# Patient Record
Sex: FEMALE | Race: BLACK OR AFRICAN AMERICAN | NOT HISPANIC OR LATINO | Employment: UNEMPLOYED | URBAN - METROPOLITAN AREA
[De-identification: names, ages, dates, MRNs, and addresses within clinical notes are randomized per-mention and may not be internally consistent; named-entity substitution may affect disease eponyms.]

---

## 2021-11-13 ENCOUNTER — OFFICE VISIT (OUTPATIENT)
Dept: URGENT CARE | Facility: CLINIC | Age: 36
End: 2021-11-13

## 2021-11-13 VITALS
WEIGHT: 186.4 LBS | HEART RATE: 95 BPM | BODY MASS INDEX: 31.06 KG/M2 | OXYGEN SATURATION: 98 % | DIASTOLIC BLOOD PRESSURE: 62 MMHG | SYSTOLIC BLOOD PRESSURE: 116 MMHG | HEIGHT: 65 IN | RESPIRATION RATE: 16 BRPM | TEMPERATURE: 98.9 F

## 2021-11-13 DIAGNOSIS — O21.9 NAUSEA AND VOMITING DURING PREGNANCY PRIOR TO 22 WEEKS GESTATION: ICD-10-CM

## 2021-11-13 DIAGNOSIS — N92.6 MISSED PERIOD: ICD-10-CM

## 2021-11-13 DIAGNOSIS — Z32.01 POSITIVE PREGNANCY TEST: Primary | ICD-10-CM

## 2021-11-13 LAB
APPEARANCE UR: CLEAR
BILIRUB UR STRIP-MCNC: NORMAL MG/DL
COLOR UR AUTO: YELLOW
GLUCOSE UR STRIP.AUTO-MCNC: NORMAL MG/DL
INT CON NEG: NEGATIVE
INT CON POS: POSITIVE
KETONES UR STRIP.AUTO-MCNC: NORMAL MG/DL
LEUKOCYTE ESTERASE UR QL STRIP.AUTO: NORMAL
NITRITE UR QL STRIP.AUTO: NORMAL
PH UR STRIP.AUTO: 7.5 [PH] (ref 5–8)
POC URINE PREGNANCY TEST: NORMAL
PROT UR QL STRIP: 30 MG/DL
RBC UR QL AUTO: NORMAL
SP GR UR STRIP.AUTO: 1.03
UROBILINOGEN UR STRIP-MCNC: 0.2 MG/DL

## 2021-11-13 PROCEDURE — 81002 URINALYSIS NONAUTO W/O SCOPE: CPT | Performed by: PHYSICIAN ASSISTANT

## 2021-11-13 PROCEDURE — 99203 OFFICE O/P NEW LOW 30 MIN: CPT | Performed by: PHYSICIAN ASSISTANT

## 2021-11-13 PROCEDURE — 81025 URINE PREGNANCY TEST: CPT | Performed by: PHYSICIAN ASSISTANT

## 2021-11-13 RX ORDER — DOXYLAMINE SUCCINATE AND PYRIDOXINE HYDROCHLORIDE, DELAYED RELEASE TABLETS 10 MG/10 MG 10; 10 MG/1; MG/1
1 TABLET, DELAYED RELEASE ORAL 2 TIMES DAILY PRN
Qty: 60 TABLET | Refills: 0 | Status: SHIPPED | OUTPATIENT
Start: 2021-11-13 | End: 2021-12-13

## 2021-11-13 RX ORDER — VITAMIN A, VITAMIN C, VITAMIN D-3, VITAMIN E, VITAMIN B-1, VITAMIN B-2, NIACIN, VITAMIN B-6, CALCIUM, IRON, ZINC, COPPER 4000; 120; 400; 22; 1.84; 3; 20; 10; 1; 12; 200; 27; 25; 2 [IU]/1; MG/1; [IU]/1; MG/1; MG/1; MG/1; MG/1; MG/1; MG/1; UG/1; MG/1; MG/1; MG/1; MG/1
1 TABLET ORAL DAILY
Qty: 30 TABLET | Refills: 9 | Status: SHIPPED | OUTPATIENT
Start: 2021-11-13 | End: 2022-07-17

## 2021-11-13 ASSESSMENT — ENCOUNTER SYMPTOMS: NAUSEA: 1

## 2021-11-13 NOTE — PROGRESS NOTES
"Josey Hernandez is a 36 y.o. female who presents with Nausea (Nausea, vomiting, weakness, loss of appetite)            Patient presents with:  Nausea: Nausea, vomiting, weakness, loss of appetite due to pregnancy.    + home preg x 2    LMP : 9/14/2021.    Needs pregnancy test here so she can been seen at [facility].      Nausea  This is a new problem. The current episode started in the past 7 days. The problem occurs daily. The problem has been waxing and waning. Associated symptoms include nausea and vomiting. Pertinent negatives include no abdominal pain, chills or fever. The symptoms are aggravated by eating and drinking. Treatments tried: change of food choices., ginger ale. The treatment provided mild relief.       Review of Systems   Constitutional: Negative for chills and fever.   Gastrointestinal: Positive for nausea and vomiting. Negative for abdominal pain and diarrhea.   Genitourinary: Negative.    All other systems reviewed and are negative.             Objective     /62   Pulse 95   Temp 37.2 °C (98.9 °F)   Resp 16   Ht 1.64 m (5' 4.57\")   Wt 84.6 kg (186 lb 6.4 oz)   SpO2 98%   BMI 31.44 kg/m²      Physical Exam  Vitals and nursing note reviewed.   Constitutional:       General: She is not in acute distress.     Appearance: Normal appearance. She is well-developed and normal weight. She is not ill-appearing or toxic-appearing.   HENT:      Head: Normocephalic and atraumatic.      Right Ear: Tympanic membrane normal.      Left Ear: Tympanic membrane normal.      Nose: Nose normal.      Mouth/Throat:      Lips: Pink.      Mouth: Mucous membranes are moist.      Pharynx: Oropharynx is clear. Uvula midline.   Eyes:      Extraocular Movements: Extraocular movements intact.      Conjunctiva/sclera: Conjunctivae normal.      Pupils: Pupils are equal, round, and reactive to light.   Cardiovascular:      Rate and Rhythm: Normal rate and regular rhythm.      Pulses: Normal " pulses.      Heart sounds: Normal heart sounds.   Pulmonary:      Effort: Pulmonary effort is normal.      Breath sounds: Normal breath sounds.   Abdominal:      General: Bowel sounds are normal.      Palpations: Abdomen is soft.   Musculoskeletal:         General: Normal range of motion.      Cervical back: Normal range of motion and neck supple.   Skin:     General: Skin is warm and dry.      Capillary Refill: Capillary refill takes less than 2 seconds.   Neurological:      General: No focal deficit present.      Mental Status: She is alert and oriented to person, place, and time.      Cranial Nerves: No cranial nerve deficit.      Motor: Motor function is intact.      Coordination: Coordination is intact.      Gait: Gait normal.   Psychiatric:         Mood and Affect: Mood normal.                 Lab Results/POC Test Results   Results for orders placed or performed in visit on 11/13/21   POCT Urinalysis   Result Value Ref Range    POC Color Yellow Negative    POC Appearance Clear Negative    POC Leukocyte Esterase NEG Negative    POC Nitrites NEG Negative    POC Urobiligen 0.2 Negative (0.2) mg/dL    POC Protein 30 Negative mg/dL    POC Urine PH 7.5 5.0 - 8.0    POC Blood NEG Negative    POC Specific Gravity 1.030 <1.005 - >1.030    POC Ketones NEG Negative mg/dL    POC Bilirubin NEG Negative mg/dL    POC Glucose NEG Negative mg/dL   POCT Pregnancy   Result Value Ref Range    POC Urine Pregnancy Test POS Negative    Internal Control Positive Positive     Internal Control Negative Negative                          Assessment & Plan           1. Positive pregnancy test  Doxylamine-Pyridoxine 10-10 MG Tablet Delayed Response delayed-release tablet    Prenatal Vit-Fe Fumarate-FA (PRENATAL VITAMIN PLUS LOW IRON) 27-1 MG Tab    Referral to OB/Gyn   2. Missed period  POCT Urinalysis    POCT Pregnancy    Doxylamine-Pyridoxine 10-10 MG Tablet Delayed Response delayed-release tablet    Prenatal Vit-Fe Fumarate-FA (PRENATAL  VITAMIN PLUS LOW IRON) 27-1 MG Tab    Referral to OB/Gyn   3. Nausea and vomiting during pregnancy prior to 22 weeks gestation  POCT Urinalysis    POCT Pregnancy    Doxylamine-Pyridoxine 10-10 MG Tablet Delayed Response delayed-release tablet    Prenatal Vit-Fe Fumarate-FA (PRENATAL VITAMIN PLUS LOW IRON) 27-1 MG Tab    Referral to OB/Gyn       Patient was evaluated in clinic today while wearing appropriate personal protective equipment.      PT to begin prescription medications today as discussed for morning sickness.    Referral to OB given to patient.     PT should follow up with PCP in 1-2 days for re-evaluation if symptoms have not improved.      Discussed red flags and reasons to return to UC or ED.      Pt and/or family verbalized understanding of diagnosis and follow up instructions and was offered informational handout on diagnosis.  PT discharged.

## 2021-11-13 NOTE — PATIENT INSTRUCTIONS
Pregnancy and Medications  Most of the time, medicine a pregnant woman is taking does not enter the fetus, but sometimes it can. This may cause damage or birth defects. The risk of damage being done to a fetus is the greatest in the first few weeks of pregnancy. This is when major organs are developing. If you are taking any prescription, over-the-counter or herbal medicines, it is best to talk to your caregiver about the medications you are taking before getting pregnant. If you become pregnant, stop taking over-the-counter and herbal medications right away. Tell your caregiver what you were taking. Also, tell him/her medications, if any, you took before knowing you were pregnant. Never take any drugs during pregnancy unless your caregiver gives you permission.  Other things like caffeine, vitamins, herbal teas and remedies can affect the growing fetus. Talk to your caregiver about cutting down on caffeine. Also, ask what type of vitamins you need to take. Never use any herbal product without talking to your caregiver first.   MEDICINES THAT ARE NOT SAFE TO TAKE DURING PREGNANCY   · Category A - drugs that have been tested for safety during pregnancy and have been found to be safe. This includes drugs such as:  · Folic acid.  · Vitamin B6.  · Thyroid medicine in moderation or in prescribed doses.  · Category B - drugs that have been used a lot during pregnancy and do not appear to cause major birth defects or other problems. This includes drugs such as:  · Some antibiotics.  · Acetaminophen (Tylenol®).  · Aspartame (artificial sweetener).  · Famotidine (Pepcid®).  · Prednisone (cortisone).  · Insulin (for diabetes).  · Ibuprofen (Advil®, Motrin®) before the third trimester. Pregnant women should not take ibuprofen during the last three months of pregnancy.  · Category C - drugs that are more likely to cause problems for the mother or fetus. It also includes drugs for which safety studies have not been finished. The  majority of these drugs do not have safety studies in progress. These drugs often come with a warning that they should be used only if the benefits of taking them outweigh the risks. This is something a woman would need to carefully discuss with her caregiver. These drugs include:  · Prochlorperzaine (Compazine®).  · Sudafed®.  · Fluconazole (Diflucan®).  · Ciprofloxacin (Cipro®).  · Some antidepressants are also included in this group.  · Category D - drugs that have clear health risks for the fetus. They include:  · Alcohol.  · Lithium (used to treat manic depression).  · Phenytoin (Dilantin®).  · Most chemotherapy drugs to treat cancer. In some cases, chemotherapy drugs are given during pregnancy.  · Category X - drugs that have been shown to cause birth defects. They should never be taken during pregnancy. These include:  · Drugs to treat skin conditions like cystic acne (Accutane®) and psoriasis (Tegison® or Soriatane®).  · Thalidomide (a sedative).  · Diethylstilbestrol or CHINTAN.  No medication is considered 100% safe to take when pregnant because everyone reacts to drugs differently.  Aspirin and other drugs containing salicylate are not recommended during pregnancy, especially during the last three months because it can cause bleeding. In rare cases, a woman's caregiver may want her to use these types of drugs under close watch. Acetylsalicylate, a common ingredient in many over-the-counter painkillers, may make a pregnancy last longer. It may cause severe bleeding before and after delivery.   SHOULD I AVOID TAKING ANY MEDICINE WHILE I AM PREGNANT?   Whether or not you should continue taking medicine during pregnancy is a serious question. However, if you stop taking medicine that you need, this could harm both you and your baby. Talk to your caregiver about if the benefits outweigh the risk for you and your baby.   Pregnant and nursing women who need medication for psychiatric conditions should consult with  their obstetrician, pediatrician and mental health care provider before taking any medication.  NATURAL MEDICATIONS OR HERBAL REMEDIES WHEN YOU ARE PREGNANT  While some herbal remedies claim they will help with pregnancy, there are no studies to prove these claims are true. Likewise, there are very few studies to look at how safe and effective herbal remedies are during pregnancy. Do not take any herbal products without talking to your caregiver first. These products may contain agents that could harm you and the growing fetus. They could cause problems with your pregnancy.   If you think or know that your mother took diethylstilbesterol (CHINTAN), a factory made estrogen, when she was pregnant with you, talk with your caregiver right away. Ask her or him about:  · What types of tests you may need.  · How often they need to be done.  · Anything else you may need to do to make sure you do not develop any problems.  A woman whose mother was given CHINTAN when pregnant should be followed and screened for abnormalities of her female organs all through her life.  OVER-THE-COUNTER MEDICATIONS THAT ARE SAFE TO TAKE DURING PREGNANCY:  Any medication taken during pregnancy should be taken only with the permission of your caregiver.  · Allergy (Benadryl®).  · Cold and Flu, Tylenol (acetaminophen). Tylenol Cold, warm salt water gargles, saline nasal drops.  · Constipation (Metamucil®, Citrucil®, Fiberall/Fobricon, Colace®, Milk of Magnesia® , Senekot®).  · Diarrhea (Kaopectate®, Immodium®, Parepectolin). You should not take these in the first trimester and only take the medication for 24 hours. Call your caregiver if you still have the diarrhea.  · Headache (Tylenol).  · Ointment for cuts and scrapes (J & J, Bacitracin®, Neosporin®).  · Heartburn (Tums®, Riopan®, titralac , Gaviscon).  · Hemorrhoids (Preparation H®, anusol, tucks®, Witch hazel).  · Nausea and vomiting (Vitamin B6 100mg tablets, emetrol if you are not diabetic,  Emetrex, sea bands).  · Rashes (hydrocortisone cream or ointment, caladryl lotion or cream, benadryl cream or capsules, oatmeal bath).  · Yeast infection of the vagina (Monistat® cream or tablets, Terazol® cream).  FOR MORE INFORMATION  For more information regarding the medication you are taking and how it affects your pregnancy, go to [employer] Drug Reference link: http://www.drugs.com/drug_information.html  *Some information based on studies from The Food and Drug Administration (FDA).  Document Released: 12/18/2006 Document Revised: 03/11/2013 Document Reviewed: 09/01/2010  ExitCare® Patient Information ©2014 [employer].

## 2021-11-21 ASSESSMENT — ENCOUNTER SYMPTOMS
CHILLS: 0
VOMITING: 1
DIARRHEA: 0
FEVER: 0
ABDOMINAL PAIN: 0

## 2021-12-28 ENCOUNTER — GYNECOLOGY VISIT (OUTPATIENT)
Dept: OBGYN | Facility: CLINIC | Age: 36
End: 2021-12-28

## 2021-12-28 VITALS — WEIGHT: 197 LBS | DIASTOLIC BLOOD PRESSURE: 60 MMHG | BODY MASS INDEX: 33.22 KG/M2 | SYSTOLIC BLOOD PRESSURE: 108 MMHG

## 2021-12-28 DIAGNOSIS — N92.6 MISSED MENSES: ICD-10-CM

## 2021-12-28 DIAGNOSIS — Z32.01 ENCOUNTER FOR PREGNANCY TEST, RESULT POSITIVE: ICD-10-CM

## 2021-12-28 PROCEDURE — 99203 OFFICE O/P NEW LOW 30 MIN: CPT | Mod: 25 | Performed by: OBSTETRICS & GYNECOLOGY

## 2021-12-28 PROCEDURE — 76857 US EXAM PELVIC LIMITED: CPT | Performed by: OBSTETRICS & GYNECOLOGY

## 2021-12-28 NOTE — NON-PROVIDER
Pt here for DUB.    Pt states no complaints   Good# [phone]  LMP: 9/14/21, 15ww0d today.   Pharmacy confirmed

## 2021-12-28 NOTE — PROGRESS NOTES
Cc: Confirmation of pregnancy    HPI:  The patient is a 36 y.o.  here for confirmation of pregnancy exam.     Fetal movement reports   Vaginal bleeding denies    Leakage of fluid denies    Cramping denies   Nausea/vomiting: denies   HA  denies   Dysuria  denies     Review of systems:  Pertinent positives documented in HPI and all other systems reviewed & are negative    Gyn History:   LMP: 2021  Cycles every month, bleeding for 4d.   Sexually active with male  Birth control history: Depo provera, Condoms.  STD hx: denies   Last pap smear: 7 yrs ago reportedly negative, denies history of cervical biopsies or excisional procedures.    Pregnancy related complications:    OB History    Para Term  AB Living   4 3 3     3   SAB IAB Ectopic Molar Multiple Live Births             3      # Outcome Date GA Lbr Sabino/2nd Weight Sex Delivery Anes PTL Lv   4 Current            3 Term 14 40w0d  3.629 kg (8 lb) F Vag-Spont  N FAVIAN   2 Term 09 40w0d  4.082 kg (9 lb) M Vag-Spont  N FAVIAN   1 Term 04 40w0d  3.884 kg (8 lb 9 oz) F Vag-Spont  N FAVIAN     Past Medical History:   Diagnosis Date   • Arthritis    • Asthma      History reviewed. No pertinent surgical history.  Social History     Socioeconomic History   • Marital status: Single     Spouse name: Not on file   • Number of children: Not on file   • Years of education: Not on file   • Highest education level: Not on file   Occupational History   • Not on file   Tobacco Use   • Smoking status: Never Smoker   • Smokeless tobacco: Never Used   Vaping Use   • Vaping Use: Never used   Substance and Sexual Activity   • Alcohol use: Never   • Drug use: Never   • Sexual activity: Yes     Partners: Male     Comment: None    Other Topics Concern   • Not on file   Social History Narrative   • Not on file     Social Determinants of Health     Financial Resource Strain:    • Difficulty of Paying Living Expenses: Not on file   Food Insecurity:    • Worried  About Running Out of Food in the Last Year: Not on file   • Ran Out of Food in the Last Year: Not on file   Transportation Needs:    • Lack of Transportation (Medical): Not on file   • Lack of Transportation (Non-Medical): Not on file   Physical Activity:    • Days of Exercise per Week: Not on file   • Minutes of Exercise per Session: Not on file   Stress:    • Feeling of Stress : Not on file   Social Connections:    • Frequency of Communication with Friends and Family: Not on file   • Frequency of Social Gatherings with Friends and Family: Not on file   • Attends Evangelical Services: Not on file   • Active Member of Clubs or Organizations: Not on file   • Attends Club or Organization Meetings: Not on file   • Marital Status: Not on file   Intimate Partner Violence:    • Fear of Current or Ex-Partner: Not on file   • Emotionally Abused: Not on file   • Physically Abused: Not on file   • Sexually Abused: Not on file   Housing Stability:    • Unable to Pay for Housing in the Last Year: Not on file   • Number of Places Lived in the Last Year: Not on file   • Unstable Housing in the Last Year: Not on file     Family History   Problem Relation Age of Onset   • No Known Problems Mother    • No Known Problems Father      Allergies:   Allergies as of 12/28/2021   • (No Known Allergies)       PE:    /60   Wt 89.4 kg (197 lb)     General:appears stated age, is in no apparent distress  Head: normocephalic, non-tender  Neck: neck is supple, no jugular venous distension  Abdomen: Bowel sounds positive, nondistended, soft, nontender x4, no rebound or guarding. No organomegaly. No masses. Uterus distended to 20wks  Skin: No rashes, or ulcers or lesions seen  Psychiatric: Patient shows appropriate affect, is alert and oriented x3, intact judgment and insight.    transabdominal scan and per my read:    Indication: missed menses, positive pregnancy test.   LMP 9/14/2021 making her PB 6/21/2022    Findings: briseno  intrauterine pregnancy in vertex position with deepest vertical pocket 3.49 cm  Fetal heart rate positive at 150s  BPD 15 weeks 2 days  HC 15 weeks 4 days  AC 16 weeks 0 days  FL 15 weeks 1 day  Composite gestational age 15 weeks 4 days with estimated due date by ultrasound 2022    DATIN2022 by LMP consistent with second trimester ultrasound as per ACOG guidelines.    A/P:   1. Missed menses     2. Encounter for pregnancy test, result positive         # Newly diagnosed pregnancy  Screening options for Down Syndrome and neural tube defects discussed.  Patient desires the most routine screening, likely quadruple screening.  Needs financial appointment first then will follow up at new OB visit ASAP  SAB precautions discussed  Increase water intake and encouraged healthy nutrition.  Begin prenatal vitamins.  Discussed practice model, hospital location, and expected number of visits.    Spent 30 minutes in face-to-face patient contact in which greater than 50% of that visit was spent in counseling/coordination of care of newly diagnosed pregnancy including medical and surgical options of care.    F/u in 2 weeks for new OB visit

## 2021-12-30 ENCOUNTER — APPOINTMENT (OUTPATIENT)
Dept: OBGYN | Facility: CLINIC | Age: 36
End: 2021-12-30
Payer: MEDICAID

## 2022-01-26 PROBLEM — O09.529 ADVANCED MATERNAL AGE IN MULTIGRAVIDA: Status: ACTIVE | Noted: 2022-01-26

## 2022-01-27 ENCOUNTER — INITIAL PRENATAL (OUTPATIENT)
Dept: OBGYN | Facility: CLINIC | Age: 37
End: 2022-01-27

## 2022-01-27 ENCOUNTER — HOSPITAL ENCOUNTER (OUTPATIENT)
Facility: MEDICAL CENTER | Age: 37
End: 2022-01-27
Attending: NURSE PRACTITIONER
Payer: COMMERCIAL

## 2022-01-27 ENCOUNTER — APPOINTMENT (OUTPATIENT)
Dept: OBGYN | Facility: CLINIC | Age: 37
End: 2022-01-27
Payer: MEDICAID

## 2022-01-27 VITALS
WEIGHT: 200.9 LBS | BODY MASS INDEX: 33.47 KG/M2 | HEIGHT: 65 IN | SYSTOLIC BLOOD PRESSURE: 110 MMHG | DIASTOLIC BLOOD PRESSURE: 64 MMHG

## 2022-01-27 DIAGNOSIS — O09.529 ANTEPARTUM MULTIGRAVIDA OF ADVANCED MATERNAL AGE: ICD-10-CM

## 2022-01-27 LAB
APPEARANCE UR: CLEAR
BILIRUB UR STRIP-MCNC: NORMAL MG/DL
COLOR UR AUTO: YELLOW
GLUCOSE UR STRIP.AUTO-MCNC: NEGATIVE MG/DL
KETONES UR STRIP.AUTO-MCNC: NEGATIVE MG/DL
LEUKOCYTE ESTERASE UR QL STRIP.AUTO: NEGATIVE
NITRITE UR QL STRIP.AUTO: NEGATIVE
PH UR STRIP.AUTO: 7 [PH] (ref 5–8)
PROT UR QL STRIP: NEGATIVE MG/DL
RBC UR QL AUTO: NEGATIVE
SP GR UR STRIP.AUTO: 1.02
UROBILINOGEN UR STRIP-MCNC: NORMAL MG/DL

## 2022-01-27 PROCEDURE — 81002 URINALYSIS NONAUTO W/O SCOPE: CPT | Performed by: NURSE PRACTITIONER

## 2022-01-27 PROCEDURE — 0500F INITIAL PRENATAL CARE VISIT: CPT | Performed by: NURSE PRACTITIONER

## 2022-01-27 ASSESSMENT — EDINBURGH POSTNATAL DEPRESSION SCALE (EPDS)
I HAVE BEEN SO UNHAPPY THAT I HAVE BEEN CRYING: NO, NEVER
I HAVE FELT SCARED OR PANICKY FOR NO GOOD REASON: YES, SOMETIMES
I HAVE BEEN ANXIOUS OR WORRIED FOR NO GOOD REASON: YES, SOMETIMES
THE THOUGHT OF HARMING MYSELF HAS OCCURRED TO ME: NEVER
I HAVE LOOKED FORWARD WITH ENJOYMENT TO THINGS: DEFINITELY LESS THAN I USED TO
TOTAL SCORE: 9
I HAVE BEEN SO UNHAPPY THAT I HAVE HAD DIFFICULTY SLEEPING: NOT VERY OFTEN
THINGS HAVE BEEN GETTING ON TOP OF ME: NO, I HAVE BEEN COPING AS WELL AS EVER
I HAVE BLAMED MYSELF UNNECESSARILY WHEN THINGS WENT WRONG: NOT VERY OFTEN
I HAVE FELT SAD OR MISERABLE: NOT VERY OFTEN
I HAVE BEEN ABLE TO LAUGH AND SEE THE FUNNY SIDE OF THINGS: AS MUCH AS I ALWAYS COULD

## 2022-01-27 NOTE — PROGRESS NOTES
"NOB visit  Pt reports light flutters \"sometimes\"  WT: 200.9 lb  BP: 110/64  Preferred pharmacy verified with pt.   Pt states has nausea in the evenings . States no complaints or concerns today.   AFP Screening offered today, pt declines AFP  Declines AFP   Declines the flu vaccine  EPDS Score: (9)  Good # [phone]  "

## 2022-01-27 NOTE — PROGRESS NOTES
Subjective:   Tami Hernandez is a 36 y.o.  who presents for her new OB exam.  She is 19w2d with an PB of Estimated Date of Delivery: 22 by 21 she was sure of. She is feeling well and has no concerns at this time. Denies VB, LOF, contractions or pain. No ER visits or previous care in this pregnancy. Denies dysuria, vaginal DC, fever. Reports light fetal movement. Declines AFP.  Declines CF.  Desires NIPT testing. Declines MFM consult for AMA.     Past Medical History:   Diagnosis Date   • Arthritis    • Asthma        Psych Hx: Patient denies any history of depression, anxiety, PTSD, bipolar or any other psychological issues.     EPDS today: 9    History reviewed. No pertinent surgical history.     OB History    Para Term  AB Living   4 3 3     3   SAB IAB Ectopic Molar Multiple Live Births             3      # Outcome Date GA Lbr Sabino/2nd Weight Sex Delivery Anes PTL Lv   4 Current            3 Term 14 40w0d  3.629 kg (8 lb) F Vag-Spont None N FAVIAN      Birth Comments: Pt states no complications   2 Term 09 40w0d  4.082 kg (9 lb) M Vag-Spont None N FAVIAN      Birth Comments: Pt states no complications   1 Term 04 40w0d  3.884 kg (8 lb 9 oz) F Vag-Spont None N FAVIAN      Birth Comments: Pt states no complications        Gynecological Hx: Denies any hx of STIs, including HSV. Denies any vulvovaginal disorders and no hx of abnormal cervical cytology. Last pap 7 years ago normal.     Sexual Hx: One current male partner, different from other pregnancies    Contraceptive Hx: Has used depo in the past and has since discontinued use.     Family History   Problem Relation Age of Onset   • No Known Problems Mother    • No Known Problems Sister    • No Known Problems Brother      Denies any genetic disorders in family history.     Social History     Socioeconomic History   • Marital status: Single     Spouse name: Not on file   • Number of children: Not on file   • Years of education:  Not on file   • Highest education level: Not on file   Occupational History   • Not on file   Tobacco Use   • Smoking status: Never Smoker   • Smokeless tobacco: Never Used   Vaping Use   • Vaping Use: Never used   Substance and Sexual Activity   • Alcohol use: Never   • Drug use: Never   • Sexual activity: Yes     Partners: Male     Birth control/protection: None     Comment: not . unplanned pregnancy   Other Topics Concern   • Not on file   Social History Narrative   • Not on file     Social Determinants of Health     Financial Resource Strain:    • Difficulty of Paying Living Expenses: Not on file   Food Insecurity:    • Worried About Running Out of Food in the Last Year: Not on file   • Ran Out of Food in the Last Year: Not on file   Transportation Needs:    • Lack of Transportation (Medical): Not on file   • Lack of Transportation (Non-Medical): Not on file   Physical Activity:    • Days of Exercise per Week: Not on file   • Minutes of Exercise per Session: Not on file   Stress:    • Feeling of Stress : Not on file   Social Connections:    • Frequency of Communication with Friends and Family: Not on file   • Frequency of Social Gatherings with Friends and Family: Not on file   • Attends Taoist Services: Not on file   • Active Member of Clubs or Organizations: Not on file   • Attends Club or Organization Meetings: Not on file   • Marital Status: Not on file   Intimate Partner Violence:    • Fear of Current or Ex-Partner: Not on file   • Emotionally Abused: Not on file   • Physically Abused: Not on file   • Sexually Abused: Not on file   Housing Stability:    • Unable to Pay for Housing in the Last Year: Not on file   • Number of Places Lived in the Last Year: Not on file   • Unstable Housing in the Last Year: Not on file       AMBER is involved and lives with Tami Hernandez.  Pregnancy is unplanned but desired.  Reports her three other children live with their dad.   She is currently not working, denies  "any heavy lifting or exposure to potential teratogens like environmental or occupational toxins.   Denies alcohol use, drug use, or tobacco use in pregnancy.   Denies any current or hx of sexual, emotional or physical abuse or trauma.     Current Medications: PNV   Allergies: Denies allergies to medications, food, or environmental allergies    Objective:      Vitals:    01/27/22 1410   BP: 110/64   Weight: 91.1 kg (200 lb 14.4 oz)   Height: 1.645 m (5' 4.76\")        See Prenatal Physical and Prenatal Vitals  UA WNL today    Prenatal Physical:  General Exam:  HEENT: normal    Heart: normal    Thyroid: normal    Lungs: normal    Lymph Nodes: normal    Breasts: normal    Neurological: normal    Abdomen: normal    Skin: normal    Extremities: normal    Pelvic Exam:  Vulva:  Normal  Vagina:  Normal  Cervix:  Normal  Uterus (wks):  20  Adnexa:  Normal  Rectum:  Not assessed      Assessment:      1.  IUP @ 19w2d per LMP      2.  S=D      3.  See problem list as follows       Patient Active Problem List    Diagnosis Date Noted   • Advanced maternal age in multigravida 01/26/2022         Plan:   - GC/CT & pap done today  - AFP and NIPT ordered   - early 1 hr due to hx of 9lb baby  - Declines mental health referral, is still coming to terms with this pregnancy that was not planned  - Prenatal labs ordered - lab slip given  - Declines flu vacc  - Reviewed recommendations for Covid-19 vacc: declines it  - Discussed PNV, nutrition, adequate water intake, and exercise/weight gain in pregnancy  - NOB informational packet with anticipatory guidance given  - Information on Centering Pregnancy given, n/a  - S/sx of pregnancy warning signs and PTL precautions given  - Complete OB US in 1 wks  - Return to [facility] in 4 wks  "

## 2022-01-28 DIAGNOSIS — O09.529 ANTEPARTUM MULTIGRAVIDA OF ADVANCED MATERNAL AGE: ICD-10-CM

## 2022-01-30 LAB
C TRACH DNA GENITAL QL NAA+PROBE: NEGATIVE
CYTOLOGY REG CYTOL: NORMAL
HPV HR 12 DNA CVX QL NAA+PROBE: NEGATIVE
HPV16 DNA SPEC QL NAA+PROBE: NEGATIVE
HPV18 DNA SPEC QL NAA+PROBE: NEGATIVE
N GONORRHOEA DNA GENITAL QL NAA+PROBE: NEGATIVE
SPECIMEN SOURCE: NORMAL
SPECIMEN SOURCE: NORMAL

## 2022-02-03 ENCOUNTER — APPOINTMENT (OUTPATIENT)
Dept: RADIOLOGY | Facility: IMAGING CENTER | Age: 37
End: 2022-02-03
Attending: NURSE PRACTITIONER
Payer: MEDICAID

## 2022-02-03 ENCOUNTER — HOSPITAL ENCOUNTER (OUTPATIENT)
Dept: LAB | Facility: MEDICAL CENTER | Age: 37
End: 2022-02-03
Attending: NURSE PRACTITIONER
Payer: COMMERCIAL

## 2022-02-03 DIAGNOSIS — O09.529 ANTEPARTUM MULTIGRAVIDA OF ADVANCED MATERNAL AGE: ICD-10-CM

## 2022-02-03 LAB
ABO GROUP BLD: NORMAL
APPEARANCE UR: CLEAR
BASOPHILS # BLD AUTO: 0.3 % (ref 0–1.8)
BASOPHILS # BLD: 0.03 K/UL (ref 0–0.12)
BILIRUB UR QL STRIP.AUTO: NEGATIVE
BLD GP AB SCN SERPL QL: NORMAL
COLOR UR: YELLOW
EOSINOPHIL # BLD AUTO: 0.12 K/UL (ref 0–0.51)
EOSINOPHIL NFR BLD: 1.4 % (ref 0–6.9)
ERYTHROCYTE [DISTWIDTH] IN BLOOD BY AUTOMATED COUNT: 44.4 FL (ref 35.9–50)
GLUCOSE 1H P 50 G GLC PO SERPL-MCNC: 137 MG/DL (ref 70–139)
GLUCOSE UR STRIP.AUTO-MCNC: NEGATIVE MG/DL
HBV SURFACE AG SER QL: ABNORMAL
HCT VFR BLD AUTO: 37.8 % (ref 37–47)
HCV AB SER QL: NORMAL
HGB BLD-MCNC: 12.3 G/DL (ref 12–16)
HIV 1+2 AB+HIV1 P24 AG SERPL QL IA: NORMAL
IMM GRANULOCYTES # BLD AUTO: 0.41 K/UL (ref 0–0.11)
IMM GRANULOCYTES NFR BLD AUTO: 4.6 % (ref 0–0.9)
KETONES UR STRIP.AUTO-MCNC: NEGATIVE MG/DL
LEUKOCYTE ESTERASE UR QL STRIP.AUTO: NEGATIVE
LYMPHOCYTES # BLD AUTO: 1.46 K/UL (ref 1–4.8)
LYMPHOCYTES NFR BLD: 16.5 % (ref 22–41)
MCH RBC QN AUTO: 30.3 PG (ref 27–33)
MCHC RBC AUTO-ENTMCNC: 32.5 G/DL (ref 33.6–35)
MCV RBC AUTO: 93.1 FL (ref 81.4–97.8)
MICRO URNS: ABNORMAL
MONOCYTES # BLD AUTO: 0.54 K/UL (ref 0–0.85)
MONOCYTES NFR BLD AUTO: 6.1 % (ref 0–13.4)
NEUTROPHILS # BLD AUTO: 6.28 K/UL (ref 2–7.15)
NEUTROPHILS NFR BLD: 71.1 % (ref 44–72)
NITRITE UR QL STRIP.AUTO: NEGATIVE
NRBC # BLD AUTO: 0 K/UL
NRBC BLD-RTO: 0 /100 WBC
PH UR STRIP.AUTO: 7 [PH] (ref 5–8)
PLATELET # BLD AUTO: 213 K/UL (ref 164–446)
PMV BLD AUTO: 10.6 FL (ref 9–12.9)
PROT UR QL STRIP: NEGATIVE MG/DL
RBC # BLD AUTO: 4.06 M/UL (ref 4.2–5.4)
RBC UR QL AUTO: NEGATIVE
RH BLD: NORMAL
RUBV AB SER QL: 362 IU/ML
SP GR UR STRIP.AUTO: 1.02
TREPONEMA PALLIDUM IGG+IGM AB [PRESENCE] IN SERUM OR PLASMA BY IMMUNOASSAY: ABNORMAL
UROBILINOGEN UR STRIP.AUTO-MCNC: 0.2 MG/DL
WBC # BLD AUTO: 8.8 K/UL (ref 4.8–10.8)

## 2022-02-03 PROCEDURE — 76805 OB US >/= 14 WKS SNGL FETUS: CPT | Mod: TC | Performed by: NURSE PRACTITIONER

## 2022-02-05 LAB
AMPHET CTO UR CFM-MCNC: NEGATIVE NG/ML
BACTERIA UR CULT: NORMAL
BARBITURATES CTO UR CFM-MCNC: NEGATIVE NG/ML
BENZODIAZ CTO UR CFM-MCNC: NEGATIVE NG/ML
CANNABINOIDS CTO UR CFM-MCNC: NEGATIVE NG/ML
COCAINE CTO UR CFM-MCNC: NEGATIVE NG/ML
DRUG COMMENT 753798: NORMAL
HGB A1 MFR BLD: 96.3 % (ref 95–97.9)
HGB A2 MFR BLD: 3.1 % (ref 2–3.5)
HGB C MFR BLD: 0 % (ref 0–0)
HGB E MFR BLD: 0 % (ref 0–0)
HGB F MFR BLD: 0.6 % (ref 0–2.1)
HGB FRACT BLD ELPH-IMP: NORMAL
HGB OTHER MFR BLD: 0 % (ref 0–0)
HGB S BLD QL SOLY: NORMAL
HGB S MFR BLD: 0 % (ref 0–0)
METHADONE CTO UR CFM-MCNC: NEGATIVE NG/ML
OPIATES CTO UR CFM-MCNC: NEGATIVE NG/ML
PATH INTERP BLD-IMP: NORMAL
PCP CTO UR CFM-MCNC: NEGATIVE NG/ML
PROPOXYPH CTO UR CFM-MCNC: NEGATIVE NG/ML
SIGNIFICANT IND 70042: NORMAL
SITE SITE: NORMAL
SOURCE SOURCE: NORMAL

## 2022-02-06 LAB
# FETUSES US: NORMAL
AFP MOM SERPL: 1.13
AFP SERPL-MCNC: 63 NG/ML
AGE - REPORTED: 36 YR
CURRENT SMOKER: NO
FAMILY MEMBER DISEASES HX: NO
GA METHOD: NORMAL
GA: NORMAL WK
IDDM PATIENT QL: NO
INTEGRATED SCN PATIENT-IMP: NORMAL
SPECIMEN DRAWN SERPL: NORMAL

## 2022-02-23 ENCOUNTER — APPOINTMENT (OUTPATIENT)
Dept: LAB | Facility: MEDICAL CENTER | Age: 37
End: 2022-02-23
Attending: NURSE PRACTITIONER
Payer: COMMERCIAL

## 2022-02-24 ENCOUNTER — ROUTINE PRENATAL (OUTPATIENT)
Dept: OBGYN | Facility: CLINIC | Age: 37
End: 2022-02-24
Payer: MEDICAID

## 2022-02-24 VITALS — BODY MASS INDEX: 34.53 KG/M2 | DIASTOLIC BLOOD PRESSURE: 62 MMHG | SYSTOLIC BLOOD PRESSURE: 112 MMHG | WEIGHT: 206 LBS

## 2022-02-24 DIAGNOSIS — O09.522 MULTIGRAVIDA OF ADVANCED MATERNAL AGE IN SECOND TRIMESTER: ICD-10-CM

## 2022-02-24 PROBLEM — O09.299 HISTORY OF MACROSOMIA IN INFANT IN PRIOR PREGNANCY, CURRENTLY PREGNANT: Status: ACTIVE | Noted: 2022-02-24

## 2022-02-24 PROCEDURE — 90040 PR PRENATAL FOLLOW UP: CPT | Performed by: NURSE PRACTITIONER

## 2022-02-24 NOTE — PROGRESS NOTES
SUBJECTIVE:  Pt is a 36 y.o.   at 23w2d  gestation. Presents today for follow-up prenatal care. Reports no issues at this time.  Reports fetal movement. Denies regular cramping/contractions, bleeding or leaking of fluid. Denies dysuria, headaches, N/V. Generally feels well today except some low back pain.     OBJECTIVE:  - See prenatal vitals flow  -   Vitals:    22 1029   BP: 112/62   Weight: 93.4 kg (206 lb)                 ASSESSMENT:   - IUP at 23w2d    - S=D   -   Patient Active Problem List    Diagnosis Date Noted   • History of macrosomia in infant in prior pregnancy, currently pregnant 2022   • Advanced maternal age in multigravida 2022         PLAN:  - S/sx pregnancy and labor warning signs vs general discomforts discussed  - Fetal movements and/or kick counts reviewed   - Adequate hydration reinforced  - Nutrition/exercise/vitamin education; continue PNV  - Reviewed PNP, AFP declined  - NIPT still to be drawn as pt [facility]  - Reviewed US with pt  - Flu and covid vacc declined  - Anticipatory guidance given  - RTC in 4 weeks for follow-up prenatal care and repeat GCT

## 2022-02-24 NOTE — PROGRESS NOTES
OB follow up   + fetal movement.  No VB, LOF or UC's.  [phone] (home)   Preferred pharmacy confirmed.  NIPT not drawn yet

## 2022-03-23 ENCOUNTER — ROUTINE PRENATAL (OUTPATIENT)
Dept: OBGYN | Facility: CLINIC | Age: 37
End: 2022-03-23

## 2022-03-23 VITALS — BODY MASS INDEX: 34.87 KG/M2 | SYSTOLIC BLOOD PRESSURE: 108 MMHG | WEIGHT: 208 LBS | DIASTOLIC BLOOD PRESSURE: 66 MMHG

## 2022-03-23 DIAGNOSIS — O09.522 MULTIGRAVIDA OF ADVANCED MATERNAL AGE IN SECOND TRIMESTER: ICD-10-CM

## 2022-03-23 PROCEDURE — 90040 PR PRENATAL FOLLOW UP: CPT | Performed by: NURSE PRACTITIONER

## 2022-03-23 PROCEDURE — 90715 TDAP VACCINE 7 YRS/> IM: CPT | Performed by: NURSE PRACTITIONER

## 2022-03-23 PROCEDURE — 90471 IMMUNIZATION ADMIN: CPT | Performed by: NURSE PRACTITIONER

## 2022-03-23 NOTE — LETTER
"Count Your Baby's Movements  Another step to a healthy delivery    Tami Hernandez            Dept: [phone]    How Many Weeks Pregnant? 27w1d    Date to Begin Countin2022              How to use this chart    One way for your physician to keep track of your baby's health is by knowing how often the baby moves (or \"kicks\") in your womb.  You can help your physician to do this by using this chart every day.    Every day, you should see how many hours it takes for your baby to move 10 times.  Start in the morning, as soon as you get up.    · First, write down the time your baby moves until you get to 10.  · Check off one box every time your baby moves until you get to 10.  · Write down the time you finished counting in the last column.  · Total how long it took to count up all 10 movements.  · Finally, fill in the box that shows how long this took.  After counting 10 movements, you no longer have to count any more that day.  The next morning, just start counting again as soon as you get up.    What should you call a \"movement\"?  It is hard to say, because it will feel different from one mother to another and from one pregnancy to the next.  The important thing is that you count the movements the same way throughout your pregnancy.  If you have more questions, you should ask your physician.    Count carefully every day!  SAMPLE:  Week 28    How many hours did it take to feel 10 movements?       Start  Time     1     2     3     4     5     6     7     8     9     10   Finish Time   Mon 8:20 ·  ·  ·  ·  ·  ·  ·  ·  ·  ·  11:40                  Sat               Sun                 IMPORTANT: You should contact your physician if it takes more than two hours for you to feel 10 movements.  Each morning, write down the time and start to count the movements of your baby.  Keep track by checking off one box every time you feel one movement.  When you have " "felt 10 \"kicks\", write down the time you finished counting in the last column.  Then fill in the   box (over the check jannette) for the number of hours it took.  Be sure to read the complete instructions on the previous page.            "

## 2022-03-23 NOTE — PROGRESS NOTES
Pt. Here for OB/FU. Reports Good FM.   Good # [phone]  Pt. Denies VB, LOF, or UC's.   Pharmacy verified.   Chaperone offered and not indicated   Patient states no concerns at the moment.   KEENA sheet given along with instructions  BTL signed today   Tdap today  Tdap vaccine given. Left Deltoid. Screening checklist reviewed with patient. VIS given. Pt Tolerated? Yes

## 2022-03-23 NOTE — PROGRESS NOTES
SUBJECTIVE:  Pt is a 36 y.o.   at 27w1d  gestation. Presents today for follow-up prenatal care. Reports no issues at this time.  Reports good  fetal movement. Denies regular cramping/contractions, bleeding or leaking of fluid. Denies dysuria, headaches, N/V. Generally feels well today.     OBJECTIVE:  - See prenatal vitals flow  -   Vitals:    22 1019   BP: 108/66   Weight: 94.3 kg (208 lb)                 ASSESSMENT:   - IUP at 27w1d    - S>D   -   Patient Active Problem List    Diagnosis Date Noted   • History of macrosomia in infant in prior pregnancy, currently pregnant 2022   • Advanced maternal age in multigravida 2022         PLAN:  - S/sx pregnancy and labor warning signs vs general discomforts discussed  - Fetal movements and/or kick counts reviewed   - Adequate hydration reinforced  - Nutrition/exercise/vitamin education; continue PNV  - S/p Tdap today   - Third tri labs ordered   - Anticipatory guidance given  - RTC in 3 weeks for follow-up prenatal care

## 2022-04-04 ENCOUNTER — HOSPITAL ENCOUNTER (OUTPATIENT)
Dept: LAB | Facility: MEDICAL CENTER | Age: 37
End: 2022-04-04
Attending: NURSE PRACTITIONER
Payer: COMMERCIAL

## 2022-04-04 DIAGNOSIS — O09.522 MULTIGRAVIDA OF ADVANCED MATERNAL AGE IN SECOND TRIMESTER: ICD-10-CM

## 2022-04-04 LAB
ERYTHROCYTE [DISTWIDTH] IN BLOOD BY AUTOMATED COUNT: 44.5 FL (ref 35.9–50)
GLUCOSE 1H P 50 G GLC PO SERPL-MCNC: 110 MG/DL (ref 70–139)
HCT VFR BLD AUTO: 39.5 % (ref 37–47)
HGB BLD-MCNC: 13.2 G/DL (ref 12–16)
MCH RBC QN AUTO: 31 PG (ref 27–33)
MCHC RBC AUTO-ENTMCNC: 33.4 G/DL (ref 33.6–35)
MCV RBC AUTO: 92.7 FL (ref 81.4–97.8)
PLATELET # BLD AUTO: 198 K/UL (ref 164–446)
PMV BLD AUTO: 10.3 FL (ref 9–12.9)
RBC # BLD AUTO: 4.26 M/UL (ref 4.2–5.4)
T PALLIDUM AB SER QL IA: NORMAL
WBC # BLD AUTO: 8 K/UL (ref 4.8–10.8)

## 2022-04-13 ENCOUNTER — ROUTINE PRENATAL (OUTPATIENT)
Dept: OBGYN | Facility: CLINIC | Age: 37
End: 2022-04-13
Payer: MEDICAID

## 2022-04-13 VITALS — WEIGHT: 211.6 LBS | BODY MASS INDEX: 35.47 KG/M2 | SYSTOLIC BLOOD PRESSURE: 106 MMHG | DIASTOLIC BLOOD PRESSURE: 60 MMHG

## 2022-04-13 DIAGNOSIS — O09.523 MULTIGRAVIDA OF ADVANCED MATERNAL AGE IN THIRD TRIMESTER: ICD-10-CM

## 2022-04-13 PROCEDURE — 90040 PR PRENATAL FOLLOW UP: CPT | Performed by: NURSE PRACTITIONER

## 2022-04-13 NOTE — PROGRESS NOTES
OB follow up   + fetal movement. Active  No VB, LOF or UC's.  Phone # [phone]  Preferred pharmacy confirmed.  C/o  Back pain

## 2022-04-13 NOTE — PROGRESS NOTES
SUBJECTIVE:  Pt is a 36 y.o.   at 30w1d  gestation. Presents today for follow-up prenatal care. Reports no issues at this time.  Reports good fetal movement. Denies regular cramping/contractions, bleeding or leaking of fluid. Denies dysuria, headaches, N/V. Generally feels well today except lower back pain that she is using a massager/heat pack on.     OBJECTIVE:  - See prenatal vitals flow  -   Vitals:    22 1017   BP: 106/60   Weight: 96 kg (211 lb 9.6 oz)                 ASSESSMENT:   - IUP at 30w1d    - S>D   -   Patient Active Problem List    Diagnosis Date Noted   • History of macrosomia in infant in prior pregnancy, currently pregnant 2022   • Advanced maternal age in multigravida 2022         PLAN:  - S/sx pregnancy and labor warning signs vs general discomforts discussed  - Fetal movements and/or kick counts reviewed   - Adequate hydration reinforced  - Nutrition/exercise/vitamin education; continue PNV  - Will consider growth US as needed   - Reviewed remedies for back pain   - Anticipatory guidance given  - RTC in 2 weeks for follow-up prenatal care

## 2022-04-26 ENCOUNTER — ROUTINE PRENATAL (OUTPATIENT)
Dept: OBGYN | Facility: CLINIC | Age: 37
End: 2022-04-26
Payer: MEDICAID

## 2022-04-26 VITALS — DIASTOLIC BLOOD PRESSURE: 60 MMHG | BODY MASS INDEX: 35.87 KG/M2 | WEIGHT: 214 LBS | SYSTOLIC BLOOD PRESSURE: 112 MMHG

## 2022-04-26 DIAGNOSIS — O09.523 MULTIGRAVIDA OF ADVANCED MATERNAL AGE IN THIRD TRIMESTER: ICD-10-CM

## 2022-04-26 PROCEDURE — 90040 PR PRENATAL FOLLOW UP: CPT | Performed by: NURSE PRACTITIONER

## 2022-04-26 NOTE — NON-PROVIDER
Pt here today for OB follow up  Pt states she is feeling tired   Reports +FM  Good # [phone]  Pharmacy Confirmed.  Chaperone offered and not indicated.

## 2022-04-26 NOTE — PROGRESS NOTES
SUBJECTIVE:  Pt is a 36 y.o.   at 32w0d  gestation. Presents today for follow-up prenatal care. Reports no issues at this time.  Reports   fetal movement. Denies regular cramping/contractions, bleeding or leaking of fluid. Denies dysuria, headaches, N/V. Generally feels well today except feeling tired despite sleeping well.     OBJECTIVE:  - See prenatal vitals flow  Vitals:    22 0859   BP: 112/60   Weight: 97.1 kg (214 lb)                 ASSESSMENT:   - IUP at 32w0d    - S>D  Patient Active Problem List    Diagnosis Date Noted   • History of macrosomia in infant in prior pregnancy, currently pregnant 2022   • Advanced maternal age in multigravida 2022       PLAN:  - S/sx pregnancy and labor warning signs vs general discomforts discussed  - Fetal movements and/or kick counts reviewed   - Adequate hydration reinforced  - Nutrition/exercise/vitamin education; continue PNV  - Growth US ordered   - Anticipatory guidance given  - RTC in 2 weeks for follow-up prenatal care

## 2022-05-10 ENCOUNTER — ROUTINE PRENATAL (OUTPATIENT)
Dept: OBGYN | Facility: CLINIC | Age: 37
End: 2022-05-10
Payer: MEDICAID

## 2022-05-10 ENCOUNTER — APPOINTMENT (OUTPATIENT)
Dept: RADIOLOGY | Facility: IMAGING CENTER | Age: 37
End: 2022-05-10
Attending: NURSE PRACTITIONER
Payer: MEDICAID

## 2022-05-10 VITALS — SYSTOLIC BLOOD PRESSURE: 110 MMHG | DIASTOLIC BLOOD PRESSURE: 62 MMHG | BODY MASS INDEX: 35.7 KG/M2 | WEIGHT: 213 LBS

## 2022-05-10 DIAGNOSIS — O09.523 MULTIGRAVIDA OF ADVANCED MATERNAL AGE IN THIRD TRIMESTER: Primary | ICD-10-CM

## 2022-05-10 DIAGNOSIS — O09.523 MULTIGRAVIDA OF ADVANCED MATERNAL AGE IN THIRD TRIMESTER: ICD-10-CM

## 2022-05-10 DIAGNOSIS — O09.299 HISTORY OF MACROSOMIA IN INFANT IN PRIOR PREGNANCY, CURRENTLY PREGNANT: ICD-10-CM

## 2022-05-10 PROCEDURE — 90040 PR PRENATAL FOLLOW UP: CPT | Performed by: NURSE PRACTITIONER

## 2022-05-10 PROCEDURE — 76816 OB US FOLLOW-UP PER FETUS: CPT | Mod: TC | Performed by: NURSE PRACTITIONER

## 2022-05-10 NOTE — PROGRESS NOTES
OB follow up   + fetal movement.  No VB, LOF or UC's.  Phone # [phone]  Preferred pharmacy confirmed.  Patient has US today to check growth

## 2022-05-23 ENCOUNTER — HOSPITAL ENCOUNTER (OUTPATIENT)
Facility: MEDICAL CENTER | Age: 37
End: 2022-05-23
Attending: NURSE PRACTITIONER
Payer: COMMERCIAL

## 2022-05-23 ENCOUNTER — ROUTINE PRENATAL (OUTPATIENT)
Dept: OBGYN | Facility: CLINIC | Age: 37
End: 2022-05-23
Payer: MEDICAID

## 2022-05-23 VITALS — BODY MASS INDEX: 36.37 KG/M2 | SYSTOLIC BLOOD PRESSURE: 116 MMHG | DIASTOLIC BLOOD PRESSURE: 72 MMHG | WEIGHT: 217 LBS

## 2022-05-23 DIAGNOSIS — O09.893 SUPERVISION OF OTHER HIGH RISK PREGNANCIES, THIRD TRIMESTER: ICD-10-CM

## 2022-05-23 DIAGNOSIS — O09.523 MULTIGRAVIDA OF ADVANCED MATERNAL AGE IN THIRD TRIMESTER: ICD-10-CM

## 2022-05-23 DIAGNOSIS — O09.893 SUPERVISION OF OTHER HIGH RISK PREGNANCIES, THIRD TRIMESTER: Primary | ICD-10-CM

## 2022-05-23 DIAGNOSIS — O09.299 HISTORY OF MACROSOMIA IN INFANT IN PRIOR PREGNANCY, CURRENTLY PREGNANT: ICD-10-CM

## 2022-05-23 PROCEDURE — 90040 PR PRENATAL FOLLOW UP: CPT | Performed by: NURSE PRACTITIONER

## 2022-05-23 NOTE — PROGRESS NOTES
S:  No c/o today, doing well.  Has questions about her lab results.  Reports good FM.  Denies VB, LOF, RUCs, or vaginal DC.     O:    Vitals:    05/23/22 1321   BP: 116/72   Weight: 98.4 kg (217 lb)     See flow sheet.    Growth US  5/10/2022 10:31 AM     HISTORY/REASON FOR EXAM:  Size greater than dates.     TECHNIQUE/EXAM DESCRIPTION: OB limited ultrasound.     COMPARISON:  2/3/2022 OB complete ultrasound.     FINDINGS:  Fetal Lie:  Vertex  LMP:  9/14/2021  Clinical PB by LMP:  6/21/2022     Placenta (Location):  Posterior right  Placenta Previa: No  Placental ndGndrndanddndend:nd nd2nd Amniotic Fluid Volume:  ANGELICA = 18.24 cm     Fetal Heart Rate:  169 bpm     Cervical Length:  Not seen     No maternal adnexal mass is identified.     Fetal Biometry  BPD    8.78 cm, 35 weeks 3 days, (85.5%)  HC    32.00 cm, 36 weeks 0 days, (67.5%)  AC    30.77 cm, 34 weeks 5 days, (74.1%)  Femur Length    6.79 cm, 34 weeks 6 days, (64.6%)  Humerus Length    5.89 cm, 34 weeks 1 day , (64.4%)     EGA by this US:  35 weeks 0 days  PB by this US: 6/14/2022  PB by 1st US:  6/17/2022     Estimated Fetal Weight:  2564 grams  EFW Percentile: 72.3%     Comments:     IMPRESSION:     1.  Single intrauterine pregnancy of an estimated gestational age of 35 weeks 0 days with an estimated date of delivery of 6/14/2022.          A:  IUP at 35w6d  Patient Active Problem List    Diagnosis Date Noted   • History of macrosomia in infant in prior pregnancy, currently pregnant 02/24/2022   • Advanced maternal age in multigravida 01/26/2022       P:  1.  GBS obtained.          2.  Labor precautions given.  Instructions given on where to go.  Pt receptive to              education.          3.  Questions answered.          4.  Continue FKCs.          5.  Encouraged adequate water intake        6.  F/u 1 wk.        7.  Lab & US results reviewed w pt.        8.  L&D policies reviewed w pt.         9.  Start weekly NST next visit.    Chaperone offered and provided by  Violetta Roman MA.

## 2022-05-23 NOTE — NON-PROVIDER
OB follow up   +FM, Denies VB, LOF or UC's.  Phone # [phone]  Preferred pharmacy confirmed  Pt c/o irregular UC

## 2022-05-25 LAB — GP B STREP DNA SPEC QL NAA+PROBE: NEGATIVE

## 2022-05-31 ENCOUNTER — ROUTINE PRENATAL (OUTPATIENT)
Dept: OBGYN | Facility: CLINIC | Age: 37
End: 2022-05-31
Payer: MEDICAID

## 2022-05-31 ENCOUNTER — NON-PROVIDER VISIT (OUTPATIENT)
Dept: OBGYN | Facility: CLINIC | Age: 37
End: 2022-05-31
Payer: MEDICAID

## 2022-05-31 VITALS — WEIGHT: 216.1 LBS | SYSTOLIC BLOOD PRESSURE: 120 MMHG | BODY MASS INDEX: 36.22 KG/M2 | DIASTOLIC BLOOD PRESSURE: 70 MMHG

## 2022-05-31 DIAGNOSIS — O09.299 HISTORY OF MACROSOMIA IN INFANT IN PRIOR PREGNANCY, CURRENTLY PREGNANT: ICD-10-CM

## 2022-05-31 DIAGNOSIS — O09.523 MULTIGRAVIDA OF ADVANCED MATERNAL AGE IN THIRD TRIMESTER: ICD-10-CM

## 2022-05-31 LAB
NST ACOUSTIC STIMULATION: NORMAL
NST ACTION NECESSARY: NORMAL
NST ASSESSMENT: NORMAL
NST BASELINE: NORMAL
NST INDICATIONS: NORMAL
NST OTHER DATA: NORMAL
NST READ BY: NORMAL
NST RETURN: NORMAL
NST UTERINE ACTIVITY: NORMAL

## 2022-05-31 PROCEDURE — 90040 PR PRENATAL FOLLOW UP: CPT | Performed by: NURSE PRACTITIONER

## 2022-05-31 NOTE — PROGRESS NOTES
SUBJECTIVE:  Pt is a 36 y.o.   at 37w0d  gestation. Presents today for follow-up prenatal care. Reports no issues at this time.  Reports good fetal movement. Denies regular cramping/contractions, bleeding or leaking of fluid. Denies dysuria, headaches, N/V. Generally feels well today except contractions since 4am that have been about five minutes apart. Reports she has not been hydrated the past two days and has noticed contractions during this time.     OBJECTIVE:  - See prenatal vitals flow  -   Vitals:    22 1316   BP: 120/70   Weight: 98 kg (216 lb 1.6 oz)                 ASSESSMENT:   - IUP at 37w0d    - S=D   - ./ballotable   Patient Active Problem List    Diagnosis Date Noted   • History of macrosomia in infant in prior pregnancy, currently pregnant 2022   • Advanced maternal age in multigravida 2022         PLAN:  - S/sx pregnancy and labor warning signs vs general discomforts discussed  - Fetal movements and/or kick counts reviewed   - Adequate hydration reinforced  - Nutrition/exercise/vitamin education; continue PNV  - NST reactive today and needs NST next week  - Reports does not want to get induced early so will see how things go  - Reviewed active labor v. Early labor and need for hydration especially at this stage of pregnancy   - Anticipatory guidance given  - RTC in 1 eeks for follow-up prenatal care/ NST

## 2022-05-31 NOTE — PROGRESS NOTES
Pt here today for OB follow up  Negative GBS, pt informed  Reports +FM  WT: 98 kg   BP: 120/70  Preferred pharmacy verified with pt.   Pt states she has been having contractions since yesterday morning 5 minutes apart. States no other complaints or concerns today  Good # [phone]

## 2022-06-04 ENCOUNTER — HOSPITAL ENCOUNTER (INPATIENT)
Facility: MEDICAL CENTER | Age: 37
LOS: 2 days | End: 2022-06-06
Attending: OBSTETRICS & GYNECOLOGY | Admitting: OBSTETRICS & GYNECOLOGY
Payer: MEDICAID

## 2022-06-04 PROBLEM — O36.8390 NON-REASSURING FETAL HEART RATE OR RHYTHM AFFECTING MANAGEMENT OF MOTHER: Status: ACTIVE | Noted: 2022-06-04

## 2022-06-04 LAB
BASOPHILS # BLD AUTO: 0.4 % (ref 0–1.8)
BASOPHILS # BLD: 0.04 K/UL (ref 0–0.12)
EOSINOPHIL # BLD AUTO: 0.05 K/UL (ref 0–0.51)
EOSINOPHIL NFR BLD: 0.5 % (ref 0–6.9)
ERYTHROCYTE [DISTWIDTH] IN BLOOD BY AUTOMATED COUNT: 42.5 FL (ref 35.9–50)
HCT VFR BLD AUTO: 41.8 % (ref 37–47)
HGB BLD-MCNC: 14.4 G/DL (ref 12–16)
HOLDING TUBE BB 8507: NORMAL
IMM GRANULOCYTES # BLD AUTO: 0.16 K/UL (ref 0–0.11)
IMM GRANULOCYTES NFR BLD AUTO: 1.6 % (ref 0–0.9)
LYMPHOCYTES # BLD AUTO: 2.19 K/UL (ref 1–4.8)
LYMPHOCYTES NFR BLD: 22.2 % (ref 22–41)
MCH RBC QN AUTO: 30.8 PG (ref 27–33)
MCHC RBC AUTO-ENTMCNC: 34.4 G/DL (ref 33.6–35)
MCV RBC AUTO: 89.3 FL (ref 81.4–97.8)
MONOCYTES # BLD AUTO: 0.92 K/UL (ref 0–0.85)
MONOCYTES NFR BLD AUTO: 9.3 % (ref 0–13.4)
NEUTROPHILS # BLD AUTO: 6.51 K/UL (ref 2–7.15)
NEUTROPHILS NFR BLD: 66 % (ref 44–72)
NRBC # BLD AUTO: 0 K/UL
NRBC BLD-RTO: 0 /100 WBC
PLATELET # BLD AUTO: 221 K/UL (ref 164–446)
PMV BLD AUTO: 10.4 FL (ref 9–12.9)
RBC # BLD AUTO: 4.68 M/UL (ref 4.2–5.4)
WBC # BLD AUTO: 9.9 K/UL (ref 4.8–10.8)

## 2022-06-04 PROCEDURE — 85025 COMPLETE CBC W/AUTO DIFF WBC: CPT

## 2022-06-04 PROCEDURE — 36415 COLL VENOUS BLD VENIPUNCTURE: CPT

## 2022-06-04 PROCEDURE — 700111 HCHG RX REV CODE 636 W/ 250 OVERRIDE (IP): Performed by: ADVANCED PRACTICE MIDWIFE

## 2022-06-04 PROCEDURE — 700105 HCHG RX REV CODE 258: Performed by: ADVANCED PRACTICE MIDWIFE

## 2022-06-04 PROCEDURE — 99282 EMERGENCY DEPT VISIT SF MDM: CPT

## 2022-06-04 PROCEDURE — 770002 HCHG ROOM/CARE - OB PRIVATE (112)

## 2022-06-04 RX ORDER — IBUPROFEN 800 MG/1
800 TABLET ORAL
Status: DISCONTINUED | OUTPATIENT
Start: 2022-06-04 | End: 2022-06-05 | Stop reason: HOSPADM

## 2022-06-04 RX ORDER — OXYTOCIN 10 [USP'U]/ML
10 INJECTION, SOLUTION INTRAMUSCULAR; INTRAVENOUS
Status: DISCONTINUED | OUTPATIENT
Start: 2022-06-04 | End: 2022-06-05 | Stop reason: HOSPADM

## 2022-06-04 RX ORDER — ONDANSETRON 4 MG/1
4 TABLET, ORALLY DISINTEGRATING ORAL EVERY 6 HOURS PRN
Status: DISCONTINUED | OUTPATIENT
Start: 2022-06-04 | End: 2022-06-05 | Stop reason: HOSPADM

## 2022-06-04 RX ORDER — ONDANSETRON 2 MG/ML
4 INJECTION INTRAMUSCULAR; INTRAVENOUS EVERY 6 HOURS PRN
Status: DISCONTINUED | OUTPATIENT
Start: 2022-06-04 | End: 2022-06-05 | Stop reason: HOSPADM

## 2022-06-04 RX ORDER — TERBUTALINE SULFATE 1 MG/ML
0.25 INJECTION, SOLUTION SUBCUTANEOUS
Status: DISCONTINUED | OUTPATIENT
Start: 2022-06-04 | End: 2022-06-05 | Stop reason: HOSPADM

## 2022-06-04 RX ORDER — ACETAMINOPHEN 500 MG
1000 TABLET ORAL
Status: DISCONTINUED | OUTPATIENT
Start: 2022-06-04 | End: 2022-06-05 | Stop reason: HOSPADM

## 2022-06-04 RX ORDER — SODIUM CHLORIDE, SODIUM LACTATE, POTASSIUM CHLORIDE, CALCIUM CHLORIDE 600; 310; 30; 20 MG/100ML; MG/100ML; MG/100ML; MG/100ML
INJECTION, SOLUTION INTRAVENOUS CONTINUOUS
Status: DISCONTINUED | OUTPATIENT
Start: 2022-06-04 | End: 2022-06-05 | Stop reason: HOSPADM

## 2022-06-04 RX ADMIN — OXYTOCIN 1 MILLI-UNITS/MIN: 10 INJECTION, SOLUTION INTRAMUSCULAR; INTRAVENOUS at 21:06

## 2022-06-04 RX ADMIN — SODIUM CHLORIDE, POTASSIUM CHLORIDE, SODIUM LACTATE AND CALCIUM CHLORIDE: 600; 310; 30; 20 INJECTION, SOLUTION INTRAVENOUS at 21:05

## 2022-06-04 RX ADMIN — FENTANYL CITRATE 100 MCG: 50 INJECTION, SOLUTION INTRAMUSCULAR; INTRAVENOUS at 23:03

## 2022-06-04 ASSESSMENT — LIFESTYLE VARIABLES
TOTAL SCORE: 0
EVER FELT BAD OR GUILTY ABOUT YOUR DRINKING: NO
ALCOHOL_USE: NO
HAVE YOU EVER FELT YOU SHOULD CUT DOWN ON YOUR DRINKING: NO
HAVE PEOPLE ANNOYED YOU BY CRITICIZING YOUR DRINKING: NO
EVER HAD A DRINK FIRST THING IN THE MORNING TO STEADY YOUR NERVES TO GET RID OF A HANGOVER: NO
CONSUMPTION TOTAL: INCOMPLETE
TOTAL SCORE: 0
TOTAL SCORE: 0

## 2022-06-04 ASSESSMENT — COPD QUESTIONNAIRES
COPD SCREENING SCORE: 0
HAVE YOU SMOKED AT LEAST 100 CIGARETTES IN YOUR ENTIRE LIFE: NO/DON'T KNOW
DURING THE PAST 4 WEEKS HOW MUCH DID YOU FEEL SHORT OF BREATH: NONE/LITTLE OF THE TIME
DO YOU EVER COUGH UP ANY MUCUS OR PHLEGM?: NO/ONLY WITH OCCASIONAL COLDS OR INFECTIONS
IN THE PAST 12 MONTHS DO YOU DO LESS THAN YOU USED TO BECAUSE OF YOUR BREATHING PROBLEMS: DISAGREE/UNSURE

## 2022-06-04 ASSESSMENT — PATIENT HEALTH QUESTIONNAIRE - PHQ9
SUM OF ALL RESPONSES TO PHQ9 QUESTIONS 1 AND 2: 0
2. FEELING DOWN, DEPRESSED, IRRITABLE, OR HOPELESS: NOT AT ALL
1. LITTLE INTEREST OR PLEASURE IN DOING THINGS: NOT AT ALL

## 2022-06-05 LAB
ERYTHROCYTE [DISTWIDTH] IN BLOOD BY AUTOMATED COUNT: 42.2 FL (ref 35.9–50)
HCT VFR BLD AUTO: 39.5 % (ref 37–47)
HGB BLD-MCNC: 13.6 G/DL (ref 12–16)
MCH RBC QN AUTO: 31.3 PG (ref 27–33)
MCHC RBC AUTO-ENTMCNC: 34.4 G/DL (ref 33.6–35)
MCV RBC AUTO: 90.8 FL (ref 81.4–97.8)
PLATELET # BLD AUTO: 193 K/UL (ref 164–446)
PMV BLD AUTO: 11 FL (ref 9–12.9)
RBC # BLD AUTO: 4.35 M/UL (ref 4.2–5.4)
WBC # BLD AUTO: 14.2 K/UL (ref 4.8–10.8)

## 2022-06-05 PROCEDURE — 700105 HCHG RX REV CODE 258: Performed by: ADVANCED PRACTICE MIDWIFE

## 2022-06-05 PROCEDURE — 770002 HCHG ROOM/CARE - OB PRIVATE (112)

## 2022-06-05 PROCEDURE — 700102 HCHG RX REV CODE 250 W/ 637 OVERRIDE(OP): Performed by: OBSTETRICS & GYNECOLOGY

## 2022-06-05 PROCEDURE — 59409 OBSTETRICAL CARE: CPT | Performed by: ADVANCED PRACTICE MIDWIFE

## 2022-06-05 PROCEDURE — 59409 OBSTETRICAL CARE: CPT

## 2022-06-05 PROCEDURE — 304965 HCHG RECOVERY SERVICES

## 2022-06-05 PROCEDURE — 85027 COMPLETE CBC AUTOMATED: CPT

## 2022-06-05 PROCEDURE — 700111 HCHG RX REV CODE 636 W/ 250 OVERRIDE (IP): Performed by: ADVANCED PRACTICE MIDWIFE

## 2022-06-05 PROCEDURE — 36415 COLL VENOUS BLD VENIPUNCTURE: CPT

## 2022-06-05 PROCEDURE — A9270 NON-COVERED ITEM OR SERVICE: HCPCS | Performed by: OBSTETRICS & GYNECOLOGY

## 2022-06-05 RX ORDER — DOCUSATE SODIUM 100 MG/1
100 CAPSULE, LIQUID FILLED ORAL 2 TIMES DAILY PRN
Status: DISCONTINUED | OUTPATIENT
Start: 2022-06-05 | End: 2022-06-06 | Stop reason: HOSPADM

## 2022-06-05 RX ORDER — METHYLERGONOVINE MALEATE 0.2 MG/ML
0.2 INJECTION INTRAVENOUS
Status: DISCONTINUED | OUTPATIENT
Start: 2022-06-05 | End: 2022-06-06 | Stop reason: HOSPADM

## 2022-06-05 RX ORDER — IBUPROFEN 800 MG/1
800 TABLET ORAL EVERY 8 HOURS PRN
Status: DISCONTINUED | OUTPATIENT
Start: 2022-06-05 | End: 2022-06-06 | Stop reason: HOSPADM

## 2022-06-05 RX ORDER — OXYCODONE HYDROCHLORIDE 5 MG/1
5 TABLET ORAL EVERY 4 HOURS PRN
Status: DISCONTINUED | OUTPATIENT
Start: 2022-06-05 | End: 2022-06-06 | Stop reason: HOSPADM

## 2022-06-05 RX ORDER — VITAMIN A ACETATE, BETA CAROTENE, ASCORBIC ACID, CHOLECALCIFEROL, .ALPHA.-TOCOPHEROL ACETATE, DL-, THIAMINE MONONITRATE, RIBOFLAVIN, NIACINAMIDE, PYRIDOXINE HYDROCHLORIDE, FOLIC ACID, CYANOCOBALAMIN, CALCIUM CARBONATE, FERROUS FUMARATE, ZINC OXIDE, CUPRIC OXIDE 3080; 12; 120; 400; 1; 1.84; 3; 20; 22; 920; 25; 200; 27; 10; 2 [IU]/1; UG/1; MG/1; [IU]/1; MG/1; MG/1; MG/1; MG/1; MG/1; [IU]/1; MG/1; MG/1; MG/1; MG/1; MG/1
1 TABLET, FILM COATED ORAL
Status: DISCONTINUED | OUTPATIENT
Start: 2022-06-05 | End: 2022-06-06 | Stop reason: HOSPADM

## 2022-06-05 RX ORDER — MISOPROSTOL 200 UG/1
600 TABLET ORAL
Status: DISCONTINUED | OUTPATIENT
Start: 2022-06-05 | End: 2022-06-06 | Stop reason: HOSPADM

## 2022-06-05 RX ORDER — ACETAMINOPHEN 500 MG
1000 TABLET ORAL EVERY 6 HOURS PRN
Status: DISCONTINUED | OUTPATIENT
Start: 2022-06-05 | End: 2022-06-06 | Stop reason: HOSPADM

## 2022-06-05 RX ORDER — SODIUM CHLORIDE, SODIUM LACTATE, POTASSIUM CHLORIDE, CALCIUM CHLORIDE 600; 310; 30; 20 MG/100ML; MG/100ML; MG/100ML; MG/100ML
INJECTION, SOLUTION INTRAVENOUS PRN
Status: DISCONTINUED | OUTPATIENT
Start: 2022-06-05 | End: 2022-06-06 | Stop reason: HOSPADM

## 2022-06-05 RX ADMIN — OXYTOCIN 125 ML/HR: 10 INJECTION, SOLUTION INTRAMUSCULAR; INTRAVENOUS at 00:56

## 2022-06-05 RX ADMIN — IBUPROFEN 800 MG: 800 TABLET, FILM COATED ORAL at 20:08

## 2022-06-05 RX ADMIN — PRENATAL WITH FERROUS FUM AND FOLIC ACID 1 TABLET: 3080; 920; 120; 400; 22; 1.84; 3; 20; 10; 1; 12; 200; 27; 25; 2 TABLET ORAL at 07:59

## 2022-06-05 RX ADMIN — ACETAMINOPHEN 1000 MG: 500 TABLET ORAL at 14:21

## 2022-06-05 RX ADMIN — IBUPROFEN 800 MG: 800 TABLET, FILM COATED ORAL at 03:55

## 2022-06-05 ASSESSMENT — EDINBURGH POSTNATAL DEPRESSION SCALE (EPDS)
THINGS HAVE BEEN GETTING ON TOP OF ME: NO, MOST OF THE TIME I HAVE COPED QUITE WELL
I HAVE BEEN SO UNHAPPY THAT I HAVE HAD DIFFICULTY SLEEPING: NOT AT ALL
THE THOUGHT OF HARMING MYSELF HAS OCCURRED TO ME: NEVER
I HAVE FELT SAD OR MISERABLE: NO, NOT AT ALL
I HAVE BLAMED MYSELF UNNECESSARILY WHEN THINGS WENT WRONG: NOT VERY OFTEN
I HAVE LOOKED FORWARD WITH ENJOYMENT TO THINGS: AS MUCH AS I EVER DID
I HAVE BEEN SO UNHAPPY THAT I HAVE BEEN CRYING: NO, NEVER
I HAVE BEEN ABLE TO LAUGH AND SEE THE FUNNY SIDE OF THINGS: AS MUCH AS I ALWAYS COULD
I HAVE BEEN ANXIOUS OR WORRIED FOR NO GOOD REASON: HARDLY EVER
I HAVE FELT SCARED OR PANICKY FOR NO GOOD REASON: NO, NOT AT ALL

## 2022-06-05 ASSESSMENT — PAIN DESCRIPTION - PAIN TYPE: TYPE: ACUTE PAIN

## 2022-06-05 NOTE — NON-PROVIDER
Delivery Note for Vaginal Delivery     Stage 1:  36 y.o. y/o  at 37w5d weeks admitted for fetal heart deceleration and labor. Her pregnancy has been uncomplicated. Her labor progressed spontaneously with pitocin augmentation.  The patient was noted to be GBS negative. Fetal monitoring during labor was overall category I.  Patient had IV fentanyl for pain control.     Stage II: At 0000, she was noted to be complete.  She then pushed for 4 minutes to deliver a  viable, vigorous male infant on 2022 at 0004.  The delivery was uncomplicated. Shoulders were delivered easily.  The infant was placed immediately upon mother’s abdomen. Apgars at 1 and 5 minutes were 8/9 and the infant has not yet been weighed.    Stage III: Attention was then turned to active management of the third stage. The placenta was delivered spontaneously with gentle manual traction on the umbilical cord with countertraction maintained suprapubically.  On inspection, the placenta was intact and had normal insertion.  Upon delivery of the placenta, good hemostasis was noted with fundal massage and a 40 unit bolus of pitocin in IV infusion was administered per protocol.     Laceration repair: The perineum was inspected following delivery. The patient did not have any lacerations.    Estimated blood loss for the above procedures was 150cc.     Mother and infant in stable condition, and family pleased with birth.     DESEAN Keita

## 2022-06-05 NOTE — ED PROVIDER NOTES
Emergency Obstetric Consultation     Date of Service  2022    Reason for Consultation  No chief complaint on file.      History of Presenting Illness  36 y.o. female who presented 2022 with Reason for consult: contractionsContractions: Onset was more than 2 days ago.    Perceived severity is moderate.      Fetal activity: Perceived fetal activity is normal.    Last perceived fetal movement was within the past hour.      Prenatal complications: no prenatal complications    .    Review of Systems  Review of Systems   All other systems reviewed and are negative.      Obstetric History    OB History    Para Term  AB Living   4 3 3     3   SAB IAB Ectopic Molar Multiple Live Births             3      # Outcome Date GA Lbr Sabino/2nd Weight Sex Delivery Anes PTL Lv   4 Current            3 Term 14 40w0d  3.629 kg (8 lb) F Vag-Spont None N FAVIAN      Birth Comments: Pt states no complications   2 Term 09 40w0d  4.082 kg (9 lb) M Vag-Spont None N FAVIAN      Birth Comments: Pt states no complications   1 Term 04 40w0d  3.884 kg (8 lb 9 oz) F Vag-Spont None N FAVIAN      Birth Comments: Pt states no complications       Gynecologic History  Patient's last menstrual period was 2021 (exact date).    Medical History  Past Medical History:   Diagnosis Date   • Arthritis    • Asthma        Surgical History   has no past surgical history on file.    Family History  family history includes No Known Problems in her brother, mother, and sister.    Social History   reports that she has never smoked. She has never used smokeless tobacco. She reports that she does not drink alcohol and does not use drugs.    Medications  Medications Prior to Admission   Medication Sig Dispense Refill Last Dose   • Prenatal Vit-Fe Fumarate-FA (PRENATAL VITAMIN PLUS LOW IRON) 27-1 MG Tab Take 1 Tablet by mouth every day. 30 Tablet 9        Allergies  No Known Allergies    Physical Exam  Maternal Exam:  Uterine  Assessment: Contraction strength is moderate.  Contraction frequency is irregular.     Abdomen: Patient reports no abdominal tenderness. Estimated fetal weight is 3400.    Fetal presentation: vertex    Introitus: Normal vulva. Normal vagina.  Amniotic fluid character: not assessed.    Pelvis: adequate for delivery.    Cervix: Cervix evaluated by digital exam.    3-4/70/-2Baseline rate: 150.   Variability: moderate (6-25 bpm).      Fetal State Assessment: Category II - tracings are indeterminate.  Fetal deceleration to 60s noted for 3-4 minutes with resolution through intrauterine resuscitative measures. Genitourinary:     General: Normal vulva.         Laboratory  Recent Labs     22   WBC 9.9   RBC 4.68   HEMOGLOBIN 14.4   HEMATOCRIT 41.8   MCV 89.3   MCH 30.8   MCHC 34.4   RDW 42.5   PLATELETCT 221   MPV 10.4           Assessment & Plan  Assessment:  Early latent labor.   Membrane status: intact.   Fetal well-being: indeterminate.   1. 35 yo  with IUP at 37.4  2. Cat II tracing  3. Early latent labor    Plan:  1. Admit to L & D          TYREL Puckett

## 2022-06-05 NOTE — CARE PLAN
The patient is Stable - Low risk of patient condition declining or worsening    Shift Goals  Clinical Goals: maitain tolerable pain level; lochia WDL    Progress made toward(s) clinical / shift goals:  pt given motrin for cramping. Pt lochia is light rubra. Pt has voided. Pt aware to call for other PRN pain medication as needed.     Patient is not progressing towards the following goals:

## 2022-06-05 NOTE — PROGRESS NOTES
1952: This RN at bedside for cervical exam. SVE 1.5/70/-3, vertex position. Per pt, unchanged from previous cervical exam. Discussed early labor contractions with pt. Fetal deceleration to 60s noted at 1958, pt moved to left lateral side. Assistance called. Pt positioned to hands and knees.   2002: Terry Velez at bedside, report given. SVE 3-4/70/-3. Pt repositioned, IV and fluids started, FHR recovered to 120s. Dr Dorado at bedside, report given.

## 2022-06-05 NOTE — CARE PLAN
The patient is Stable - Low risk of patient condition declining or worsening    Shift Goals  Clinical Goals: maitain tolerable pain level; lochia WDL    Progress made toward(s) clinical / shift goals:    Problem: Knowledge Deficit - Postpartum  Goal: Patient will verbalize and demonstrate understanding of self and infant care  Outcome: Progressing     Problem: Altered Physiologic Condition  Goal: Patient physiologically stable as evidenced by normal lochia, palpable uterine involution and vitals within normal limits  Outcome: Progressing       Patient is not progressing towards the following goals:

## 2022-06-05 NOTE — H&P
Admission History and Physical      Tami Hernandez is a 36 y.o. female  at 37w4d who presents for abdominal pain on and off for past few days.     Subjective:   positive  For CTXS.   positive Feels pain   negative for LOF  positive - intermittent for vaginal bleeding consistent with spotting per patient.   positive for fetal movement    Patient presented to labor and delivery for evaluation of abdominal pain consistent with contractions for past few days. She reports she had what felt like menstrual cramps and then was seen for her routine appointment. She was noted to be 1 cm at that time. She was instructed to increase water intake and this helped her cramping resolve. 1 day ago, she again experiencing irregular contractions but pain and frequency progressed today which is why she presented for evaluation.     While being evaluated in triage unit, patient was noted to have fetal heart rate deceleration down to 60s for 3-4 minutes. Scalp stim and position change helped with recovery of heart rate. Initial SVE was 1-2/70/-3 and subsequent exam was 3-4/70/-2 midposition and soft. After consult with Dr. Dorado, decision for admission made.     ROS: A comprehensive review of systems was negative.    Past Medical History:   Diagnosis Date   • Arthritis    • Asthma      No past surgical history on file.  OB History    Para Term  AB Living   4 3 3     3   SAB IAB Ectopic Molar Multiple Live Births             3      # Outcome Date GA Lbr Sabino/2nd Weight Sex Delivery Anes PTL Lv   4 Current            3 Term 14 40w0d  3.629 kg (8 lb) F Vag-Spont None N FAVIAN      Birth Comments: Pt states no complications   2 Term 09 40w0d  4.082 kg (9 lb) M Vag-Spont None N FAVIAN      Birth Comments: Pt states no complications   1 Term 04 40w0d  3.884 kg (8 lb 9 oz) F Vag-Spont None N FAVIAN      Birth Comments: Pt states no complications     Social History     Socioeconomic History   • Marital status:  Single     Spouse name: Not on file   • Number of children: Not on file   • Years of education: Not on file   • Highest education level: Not on file   Occupational History   • Not on file   Tobacco Use   • Smoking status: Never Smoker   • Smokeless tobacco: Never Used   Vaping Use   • Vaping Use: Never used   Substance and Sexual Activity   • Alcohol use: Never   • Drug use: Never   • Sexual activity: Yes     Partners: Male     Birth control/protection: None     Comment: not . unplanned pregnancy   Other Topics Concern   • Not on file   Social History Narrative   • Not on file     Social Determinants of Health     Financial Resource Strain: Not on file   Food Insecurity: Not on file   Transportation Needs: Not on file   Physical Activity: Not on file   Stress: Not on file   Social Connections: Not on file   Intimate Partner Violence: Not on file   Housing Stability: Not on file     Allergies: Patient has no known allergies.    Current Facility-Administered Medications:   •  LR infusion, , Intravenous, Continuous, Juanis Velez, OGNP  •  terbutaline (BRETHINE) injection 0.25 mg, 0.25 mg, Subcutaneous, Once PRN, Juanis Velez, OGNP  •  oxytocin (PITOCIN) infusion (for post delivery), 2,000 mL/hr, Intravenous, Once **FOLLOWED BY** oxytocin (PITOCIN) infusion (for post delivery), 125 mL/hr, Intravenous, Continuous, Juanis Velez, OGNP  •  oxytocin (PITOCIN) injection 10 Units, 10 Units, Intramuscular, Once PRN, Juanis Velez, OGNP  •  ibuprofen (MOTRIN) tablet 800 mg, 800 mg, Oral, Once PRN, Juanis Velez, OGNP  •  acetaminophen (TYLENOL) tablet 1,000 mg, 1,000 mg, Oral, Once PRN, Juanis Velez, OGNP  •  ondansetron (ZOFRAN ODT) dispertab 4 mg, 4 mg, Oral, Q6HRS PRN **OR** ondansetron (ZOFRAN) syringe/vial injection 4 mg, 4 mg, Intravenous, Q6HRS PRN, Juanis Velez, OGNP  •  oxytocin (PITOCIN) infusion (for induction), 0.5-20 belen-units/min, Intravenous, Continuous, Juanis Velez,  "OGNP    Prenatal care with [facility] starting at 15 weeks with following problems:  Patient Active Problem List    Diagnosis Date Noted   • Non-reassuring fetal heart rate or rhythm affecting management of mother 06/04/2022   • History of macrosomia in infant in prior pregnancy, currently pregnant 02/24/2022   • Advanced maternal age in multigravida 01/26/2022       Last US at 35 weeks  5/10/2022 10:31 AM     HISTORY/REASON FOR EXAM:  Size greater than dates.     TECHNIQUE/EXAM DESCRIPTION: OB limited ultrasound.     COMPARISON:  2/3/2022 OB complete ultrasound.     FINDINGS:  Fetal Lie:  Vertex  LMP:  9/14/2021  Clinical PB by LMP:  6/21/2022     Placenta (Location):  Posterior right  Placenta Previa: No  Placental ndGndrndanddndend:nd nd2nd Amniotic Fluid Volume:  ANGELICA = 18.24 cm     Fetal Heart Rate:  169 bpm     Cervical Length:  Not seen     No maternal adnexal mass is identified.     Fetal Biometry  BPD    8.78 cm, 35 weeks 3 days, (85.5%)  HC    32.00 cm, 36 weeks 0 days, (67.5%)  AC    30.77 cm, 34 weeks 5 days, (74.1%)  Femur Length    6.79 cm, 34 weeks 6 days, (64.6%)  Humerus Length    5.89 cm, 34 weeks 1 day , (64.4%)     EGA by this US:  35 weeks 0 days  PB by this US: 6/14/2022  PB by 1st US:  6/17/2022     Estimated Fetal Weight:  2564 grams  EFW Percentile: 72.3%     Comments:     IMPRESSION:     1.  Single intrauterine pregnancy of an estimated gestational age of 35 weeks 0 days with an estimated date of delivery of 6/14/2022.        Objective:      Pulse (!) 103   Temp 36.5 °C (97.7 °F) (Temporal)   Resp 16   Ht 1.575 m (5' 2\")   Wt 98 kg (216 lb)   SpO2 97%     General:   no acute distress, alert   Skin:   normal   HEENT:  PERRLA   Lungs:   CTA bilateral   Heart:   S1, S2 normal, no murmur, click, rub or gallop, regular rate and rhythm, peripheral pulses very brisk, chest is clear without rales or wheezing, no pedal edema, no hepatosplenomegaly   Abdomen:   gravid, NT   EFW:  3500   Pelvis:  adequate, " diagnonal conjugate not met   FHT:  140 BPM   Uterine Size: S=D   Presentations: Cephalic   Cervix:     Dilation: 3cm    Effacement: 75%    Station:  -2    Consistency: Soft    Position: Middle     Lab Review  Lab:   Blood type: A     Recent Results (from the past 5880 hour(s))   POCT Urinalysis    Collection Time: 11/13/21 10:15 AM   Result Value Ref Range    POC Color Yellow Negative    POC Appearance Clear Negative    POC Leukocyte Esterase NEG Negative    POC Nitrites NEG Negative    POC Urobiligen 0.2 Negative (0.2) mg/dL    POC Protein 30 Negative mg/dL    POC Urine PH 7.5 5.0 - 8.0    POC Blood NEG Negative    POC Specific Gravity 1.030 <1.005 - >1.030    POC Ketones NEG Negative mg/dL    POC Bilirubin NEG Negative mg/dL    POC Glucose NEG Negative mg/dL   POCT Pregnancy    Collection Time: 11/13/21 10:15 AM   Result Value Ref Range    POC Urine Pregnancy Test POS Negative    Internal Control Positive Positive     Internal Control Negative Negative    POCT Urinalysis    Collection Time: 01/27/22  1:52 PM   Result Value Ref Range    POC Color Yellow Negative    POC Appearance clear Negative    POC Leukocyte Esterase Negative Negative    POC Nitrites Negative Negative    POC Urobiligen n/a Negative (0.2) mg/dL    POC Protein Negative Negative mg/dL    POC Urine PH 7.0 5.0 - 8.0    POC Blood Negative Negative    POC Specific Gravity 1.020 <1.005 - >1.030    POC Ketones Negative Negative mg/dL    POC Bilirubin n/a Negative mg/dL    POC Glucose Negative Negative mg/dL   THINPREP PAP W/HPV AND CTNG    Collection Time: 01/27/22  2:48 PM   Result Value Ref Range    Cytology Reg See Path Report     Source Cervical     HPV Genotype 16 Negative Negative    HPV Genotype 18 Negative Negative    HPV Other High Risk Genotypes Negative Negative    C. trachomatis by PCR Negative Negative    N. gonorrhoeae by PCR Negative Negative    Source Cervical    GLUCOSE 1HR GESTATIONAL    Collection Time: 02/03/22  8:59 AM   Result  Value Ref Range    Glucose, Post Dose 137 70 - 139 mg/dL   URINE CULTURE    Collection Time: 02/03/22  8:59 AM    Specimen: Urine   Result Value Ref Range    Significant Indicator NEG     Source UR     Site Clean Catch     Culture Result Usual urogenital moe 10-50,000 cfu/mL    URINE DRUG SCREEN W/CONF (AR)    Collection Time: 02/03/22  8:59 AM   Result Value Ref Range    Urine Amphetamine-Methamphetam Negative Cutoff 300 ng/mL    Barbiturates Negative Cutoff 200 ng/mL    Benzodiazepines Negative Cutoff 200 ng/mL    Propoxyphene Negative Cutoff 300 ng/mL    Cocaine Metabolite Negative Cutoff 150 ng/mL    Methadone Negative Cutoff 150 ng/mL    Codeine-Morphine Negative Cutoff 300 ng/mL    Phencyclidine -Pcp Negative Cutoff 25 ng/mL    Cannabinoid Metab Negative Cutoff 20 ng/mL    Drug Comment Urine Drugs See Note    PREG CNTR PRENATAL PN    Collection Time: 02/03/22  8:59 AM   Result Value Ref Range    Color Yellow     Character Clear     Specific Gravity 1.017 <1.035    Ph 7.0 5.0 - 8.0    Glucose Negative Negative mg/dL    Ketones Negative Negative mg/dL    Protein Negative Negative mg/dL    Bilirubin Negative Negative    Urobilinogen, Urine 0.2 Negative    Nitrite Negative Negative    Leukocyte Esterase Negative Negative    Occult Blood Negative Negative    Micro Urine Req see below     WBC 8.8 4.8 - 10.8 K/uL    RBC 4.06 (L) 4.20 - 5.40 M/uL    Hemoglobin 12.3 12.0 - 16.0 g/dL    Hematocrit 37.8 37.0 - 47.0 %    MCV 93.1 81.4 - 97.8 fL    MCH 30.3 27.0 - 33.0 pg    MCHC 32.5 (L) 33.6 - 35.0 g/dL    RDW 44.4 35.9 - 50.0 fL    Platelet Count 213 164 - 446 K/uL    MPV 10.6 9.0 - 12.9 fL    Neutrophils-Polys 71.10 44.00 - 72.00 %    Lymphocytes 16.50 (L) 22.00 - 41.00 %    Monocytes 6.10 0.00 - 13.40 %    Eosinophils 1.40 0.00 - 6.90 %    Basophils 0.30 0.00 - 1.80 %    Immature Granulocytes 4.60 (H) 0.00 - 0.90 %    Nucleated RBC 0.00 /100 WBC    Neutrophils (Absolute) 6.28 2.00 - 7.15 K/uL    Lymphs (Absolute)  1.46 1.00 - 4.80 K/uL    Monos (Absolute) 0.54 0.00 - 0.85 K/uL    Eos (Absolute) 0.12 0.00 - 0.51 K/uL    Baso (Absolute) 0.03 0.00 - 0.12 K/uL    Immature Granulocytes (abs) 0.41 (H) 0.00 - 0.11 K/uL    NRBC (Absolute) 0.00 K/uL    Hepatitis B Surface Antigen Non-Reactive Non-Reactive    Rubella IgG Antibody 362.00 IU/mL    Syphilis, Treponemal Qual Non-Reactive Non-Reactive   HIV AG/AB COMBO ASSAY SCREENING    Collection Time: 02/03/22  8:59 AM   Result Value Ref Range    HIV Ag/Ab Combo Assay Non-Reactive Non Reactive   OP Prenatal Panel-Blood Bank    Collection Time: 02/03/22  8:59 AM   Result Value Ref Range    ABO Grouping Only A     Rh Grouping Only POS     Antibody Screen Scrn NEG    AFP MATERNAL SERUM ALPHA-FETOPROTEIN    Collection Time: 02/03/22  9:00 AM   Result Value Ref Range    AFP Value -Eia 63 ng/mL    AFP MOM Value 1.13     Interpretation Screen Neg     Maternal [age] at PB 36.0 yr    Maternal Weight 201.0 lbs.     Gest. Age on Collection Date 20 wks, 2 days     Gestational Age Based On Other     Multiple Pregnancy Jung     Race Black     Insulin Dependent Diabetes No     Smoking No     Family Hx NTD No     Specimen See Note    HEMOGLOBINOPATHY PROFILE    Collection Time: 02/03/22  9:00 AM   Result Value Ref Range    Hemoglobin A1 96.3 95.0 - 97.9 %    Hemoglobin A2 3.1 2.0 - 3.5 %    Hemoglobin F 0.6 0.0 - 2.1 %    Hemoglobin S 0.0 0.0 - 0.0 %    Hemaglobin C 0.0 0.0 - 0.0 %    Hemoglobin E 0.0 0.0 - 0.0 %    Hemoglobin Other 0.0 0.0 - 0.0 %    Hemoglobin Eval See Note     Hemoglobin,Capillary Electrophoresis Not Performed     Hgb Solubility Not Performed    HEP C VIRUS ANTIBODY    Collection Time: 02/03/22  9:00 AM   Result Value Ref Range    Hepatitis C Antibody Non-Reactive Non-Reactive   T.PALLIDUM AB EIA    Collection Time: 04/04/22  9:23 AM   Result Value Ref Range    Syphilis, Treponemal Qual Non-Reactive Non-Reactive   CBC WITHOUT DIFFERENTIAL    Collection Time: 04/04/22  9:23 AM    Result Value Ref Range    WBC 8.0 4.8 - 10.8 K/uL    RBC 4.26 4.20 - 5.40 M/uL    Hemoglobin 13.2 12.0 - 16.0 g/dL    Hematocrit 39.5 37.0 - 47.0 %    MCV 92.7 81.4 - 97.8 fL    MCH 31.0 27.0 - 33.0 pg    MCHC 33.4 (L) 33.6 - 35.0 g/dL    RDW 44.5 35.9 - 50.0 fL    Platelet Count 198 164 - 446 K/uL    MPV 10.3 9.0 - 12.9 fL   GLUCOSE 1HR GESTATIONAL    Collection Time: 04/04/22  9:23 AM   Result Value Ref Range    Glucose, Post Dose 110 70 - 139 mg/dL   GRP B STREP, BY PCR (ARSHAD BROTH)    Collection Time: 05/23/22  1:30 PM    Specimen: Genital   Result Value Ref Range    Strep Gp B DNA PCR Negative Negative   POCT Fetal Nonstress Test    Collection Time: 05/31/22  1:03 PM   Result Value Ref Range    NST Indications      NST Baseline      NST Uterine Activity      NST Acoustic Stimulation      NST Assessment      NST Action Necessary      NST Other Data      NST Return      NST Read By     CBC WITH DIFFERENTIAL    Collection Time: 06/04/22  8:00 PM   Result Value Ref Range    WBC 9.9 4.8 - 10.8 K/uL    RBC 4.68 4.20 - 5.40 M/uL    Hemoglobin 14.4 12.0 - 16.0 g/dL    Hematocrit 41.8 37.0 - 47.0 %    MCV 89.3 81.4 - 97.8 fL    MCH 30.8 27.0 - 33.0 pg    MCHC 34.4 33.6 - 35.0 g/dL    RDW 42.5 35.9 - 50.0 fL    Platelet Count 221 164 - 446 K/uL    MPV 10.4 9.0 - 12.9 fL    Neutrophils-Polys 66.00 44.00 - 72.00 %    Lymphocytes 22.20 22.00 - 41.00 %    Monocytes 9.30 0.00 - 13.40 %    Eosinophils 0.50 0.00 - 6.90 %    Basophils 0.40 0.00 - 1.80 %    Immature Granulocytes 1.60 (H) 0.00 - 0.90 %    Nucleated RBC 0.00 /100 WBC    Neutrophils (Absolute) 6.51 2.00 - 7.15 K/uL    Lymphs (Absolute) 2.19 1.00 - 4.80 K/uL    Monos (Absolute) 0.92 (H) 0.00 - 0.85 K/uL    Eos (Absolute) 0.05 0.00 - 0.51 K/uL    Baso (Absolute) 0.04 0.00 - 0.12 K/uL    Immature Granulocytes (abs) 0.16 (H) 0.00 - 0.11 K/uL    NRBC (Absolute) 0.00 K/uL   HOLD BLOOD BANK SPECIMEN (NOT TESTED)    Collection Time: 06/04/22  8:00 PM   Result Value Ref  Range    Holding Tube - Bb DONE           Assessment:   Tami Hernandez at 37w4d  Labor status: Early latent labor.  Obstetrical history significant for   Patient Active Problem List    Diagnosis Date Noted   • Non-reassuring fetal heart rate or rhythm affecting management of mother 06/04/2022   • History of macrosomia in infant in prior pregnancy, currently pregnant 02/24/2022   • Advanced maternal age in multigravida 01/26/2022   .      Plan:     1. Admit to L&D  2. GBS negative   3. Desires nothing for pain relief. However, did discuss with patient use of IV pain medication which is available.    4. Plan at this time is pitocin augmentation . Consider AROM for augmentation if appropriate.       CFkevin GRIFFIN/ Dr. Dorado Attending

## 2022-06-05 NOTE — L&D DELIVERY NOTE
Admit Date:   2021      Pregnancy Complications: none  Medical Induction of Labor: no  GBS: negative  Anesthesia: none  Gestational Age at delivery: 37.5 weeks    Patient  progressed quickly after SROM and called out complaining of pain. She was checked and noted to be c/+1. She was encouraged to push and she pushed well over one contraction to deliver viable male infant in CLARE position at 0004. No nuchal was noted.Infant placed to maternal abdomen where he was dried and stimulated. A spontaneous cry was noted. Apgar 8, 9      Pitocin infused via IV bolus. After delayed cord clamping, cord was doubly clamped and cut by father. Signs of placenta separation noted and gentle cord traction was completed. Intact placenta was delivered spontaneously at 0011 where 3VC was noted. EBL 150ml. Fundus firm with minimal massage. Inspection of vulva and vagina was completed and intact perineum noted. Routine postpartum care was initiated as mother and infant stable. Infant weight is pending.        Juanis LING CNM/ Dr. Dorado, Attending

## 2022-06-05 NOTE — LACTATION NOTE
This note was copied from a baby's chart.  Baby 37.5 weeks, , MOB Hx AMA, denies risk factors. MOB reports she breast fed previous babies (2) x 3 years, experienced. MOB reports this baby has been latching and breastfeeding well. MOB declines LC to assist with latch at this time, MOB attempting to nap & baby asleep.     Feed baby with feeding cues and at least a minimum of 8x/24 hours.  Expect cluster feeding, this is normal during early days of life and growth spurts.  It is not recommended to let baby sleep longer than 4 hours between feedings and if sleepy, place skin to skin to promote feeding interest and milk production.  Baby's usually feed more frequently and longer when skin to skin with mother. It is not recommended to use pacifiers or supplementing with formula until breast feeding and milk production is well established or at least 4 weeks.    MOB has Medicaid & [facility], Mother understands she F/U with WIC once discharged to home.     Breastfeeding plan:  Breastfeed on cue breastfeed a minimum 8 or more times in 24 hours no longer than 4 hours from last feed.

## 2022-06-05 NOTE — PROGRESS NOTES
[facility] -  EGA - 37.4     - Pt arrived to labor and delivery for complaints of contractions every 7 minutes . Pt placed in room LDA4.    1934 - External monitors in place X2. Category I FHT at this time. VSS. Pt reports good FM. No complaints ROM or vaginal bleeding. SVE . FOB at bedside. POC discussed with pt and family members, all questions answered.

## 2022-06-05 NOTE — PROGRESS NOTES
Received telephone report from Tari REHMAN. Patient brought up from labor and delivery via wheelchair with CNA.  Assessment done. Fundus firm. Lochia light. Instructed patient to increase fluid intake and to void as needed and to watch for increased bleeding. Patient states cramping present; will give Motrin. Call light within reach.

## 2022-06-06 VITALS
HEIGHT: 62 IN | DIASTOLIC BLOOD PRESSURE: 74 MMHG | OXYGEN SATURATION: 96 % | SYSTOLIC BLOOD PRESSURE: 115 MMHG | TEMPERATURE: 96.5 F | BODY MASS INDEX: 39.75 KG/M2 | RESPIRATION RATE: 17 BRPM | HEART RATE: 81 BPM | WEIGHT: 216 LBS

## 2022-06-06 PROCEDURE — A9270 NON-COVERED ITEM OR SERVICE: HCPCS | Performed by: OBSTETRICS & GYNECOLOGY

## 2022-06-06 PROCEDURE — 700102 HCHG RX REV CODE 250 W/ 637 OVERRIDE(OP): Performed by: OBSTETRICS & GYNECOLOGY

## 2022-06-06 RX ORDER — IBUPROFEN 800 MG/1
800 TABLET ORAL EVERY 8 HOURS PRN
Qty: 30 TABLET | Refills: 0 | Status: SHIPPED | OUTPATIENT
Start: 2022-06-06 | End: 2022-07-17

## 2022-06-06 RX ORDER — PSEUDOEPHEDRINE HCL 30 MG
100 TABLET ORAL 2 TIMES DAILY PRN
Qty: 60 CAPSULE | Refills: 0 | Status: SHIPPED | OUTPATIENT
Start: 2022-06-06 | End: 2022-07-17

## 2022-06-06 RX ORDER — ACETAMINOPHEN 500 MG
1000 TABLET ORAL EVERY 6 HOURS PRN
Qty: 30 TABLET | Refills: 0 | Status: SHIPPED | OUTPATIENT
Start: 2022-06-06 | End: 2022-07-17

## 2022-06-06 RX ADMIN — PRENATAL WITH FERROUS FUM AND FOLIC ACID 1 TABLET: 3080; 920; 120; 400; 22; 1.84; 3; 20; 10; 1; 12; 200; 27; 25; 2 TABLET ORAL at 09:39

## 2022-06-06 NOTE — PROGRESS NOTES
Discussed plan of care. Assessment done. She's ambulating and voiding well. Denies pain. Encouraged to call if with need. Will be discharged today, discussed the 2nd  screen follow-up test to patient.

## 2022-06-06 NOTE — LACTATION NOTE
This note was copied from a baby's chart.  Lactation follow-up visit:    Baby's Wt loss 3.6%, couplet to be discharged today. MOB has baby swaddled at breast, observed shallow latch. Baby has tight frenulum- noted, independently latching. LC assisted baby to left breast, using cross cradle hold, obtained deep latch- baby does pull back for shallow latch- see latch assessment score. Discussed with mother if baby pulls back, remove baby from breast & re-latch deep. LC reviewed positioning baby nipple to nose for deep latches & importance of STS. Baby does not have click while breastfeeding & mother's nipples are not damaged. Mother dripping with colostrum.     Breastfeeding plan:  Breastfeed on cue a minimum 8 or more times in 24 hours no longer than 4 hours from last feed.

## 2022-06-06 NOTE — CARE PLAN
The patient is Stable - Low risk of patient condition declining or worsening    Shift Goals  Clinical Goals: VSS,light lochia,breast feed well,pain control  Patient Goals: go home early tomorrow,breast feed,pain control  Family Goals: breast feed,discharge home tomorrow,rest    Progress made toward(s) clinical / shift goals:  progressing    Patient is not progressing towards the following goals:

## 2022-06-06 NOTE — DISCHARGE INSTRUCTIONS
PATIENT DISCHARGE EDUCATION INSTRUCTION SHEET  REASONS TO CALL YOUR OBSTETRICIAN  Persistent fever, shaking, chills (Temperature higher than 100.4) may indicate you have an infection  Heavy bleeding: soaking more than 1 pad per hour; Passing clots an egg-sized clot or bigger may mean you have an postpartum hemorrhage  Foul odor from vagina or bad smelling or discolored discharge or blood  Breast infection (Mastitis symptoms); breast pain, chills, fever, redness or red streaks, may feel flu like symptoms  Urinary pain, burning or frequency  Incision that is not healing, increased redness, swelling, tenderness or pain, or any pus from episiotomy or  site may mean you have an infection  Redness, swelling, warmth, or painful to touch in the calf area of your leg may mean you have a blood clot  Severe or intensified depression, thoughts or feelings of wanting to hurt yourself or someone else   Pain in chest, obstructed breathing or shortness of breath (trouble catching your breath) may mean you are having a postpartum complication. Call your provider immediately   Headache that does not get better, even after taking medicine, a bad headache with vision changes or pain in the upper right area of your belly may mean you have high blood pressure or post birth preeclampsia. Call your provider immediately    HAND WASHING  All family and friends should wash their hands:  Before and after holding the baby  Before feeding the baby  After using the restroom or changing the baby's diaper    WOUND CARE  Ask your physician for additional care instructions. In general:  Episiotomy/Laceration  May use glynn-spray bottle, witch hazel pads and dermaplast spray for comfort  Use glynn-spray bottle after urinating to cleanse perineal area  To prevent burning during urination spray glynn-water bottle on labial area   Pat perineal area dry until episiotomy/laceration is healed  Continue to use glynn-bottle until bleeding stops as  needed  If have a 2nd degree laceration or greater, a Sitz bath can offer relief from soreness, burning, and inflammation   Sitz Bath   Sit in 6 inches of warm water and soak laceration as needed until the laceration heals    VAGINAL CARE AND BLEEDING  Nothing inside vagina for 6 weeks:   No sexual intercourse, tampons or douching  Bleeding may continue for 2-4 weeks. Amount and color may vary  Soaking 1 pad or more in an hour for several hours is considered heavy bleeding  Passing large egg sized blood clots can be concerning  If you feel like you have heavy bleeding or are having increasing amount of blood clots call your Obstetrician immediately  If you begin feeling faint upon standing, feeling sick to your stomach, have clammy skin, a really fast heartbeat, have chills, start feeling confused, dizzy, sleepy or weak, or feeling like you're going to faint call your Obstetrician immediately    HYPERTENSION   Preeclampsia or gestational hypertension are types of high blood pressure that only pregnant women can get. It is important for you to be aware of symptoms to seek early intervention and treatment. If you have any of these symptoms immediately call your Obstetrician    Vision changes or blurred vision   Severe headache or pain that is unrelieved with medication and will not go away  Persistent pain in upper abdomen or shoulder   Increased swelling of face, feet, or hands  Difficulty breathing or shortness of breath at rest  Urinating less than usual    URINATION AND BOWEL MOVEMENTS  Eating more fiber (bran cereal, fruits, and vegetables) and drinking plenty of fluids will help to avoid constipation  Urinary frequency and urgency after childbirth is normal  If you experience any urinary pain, burning or frequency call your provider    BIRTH CONTROL  It is possible to become pregnant at any time after delivery and while breastfeeding  Plan to discuss a method of birth control with your physician at your post  "delivery follow up visit    POSTPARTUM BLUES  During the first few days after birth, you may experience a sense of the \"blues\" which may include impatience, irritability or even crying. These feelings come and go quickly. However, as many as 1 in 10 women experience emotional symptoms known as postpartum depression.     POSTPARTUM DEPRESSION    May start as early as the second or third day after delivery or take several weeks or months to develop. Symptoms of \"blues\" are present, but are more intense: Crying spells; loss of appetite; feelings of hopelessness or loss of control; fear of touching the baby; over concern or no concern at all about the baby; little or no concern about your own appearance/caring for yourself; and/or inability to sleep or excessive sleeping. Contact your Obstetrician if you are experiencing any of these symptoms     PREVENTING SHAKEN BABY  If you are angry or stressed, PUT THE BABY IN THE CRIB, step away, take some deep breaths, and wait until you are calm to care for the baby. DO NOT SHAKE THE BABY. You are not alone, call a supporter for help.  [facility] 24/7 crisis call line ([phone]) or ([phone])  You can also text them, text \"ANSWER\" ([phone])      "

## 2022-06-06 NOTE — DISCHARGE SUMMARY
Discharge Summary:      Tami Hernandez    Admit Date:   2022  Discharge Date:  2022     Admitting diagnosis:  Non-reassuring fetal heart rate or rhythm affecting management of mother [O36.8390]  Discharge Diagnosis: Status post vaginal, spontaneous.  Pregnancy Complications: AMA, Hx of macrosomia, NRFHR  Tubal Ligation:  no        History:  Past Medical History:   Diagnosis Date   • Arthritis    • Asthma      OB History    Para Term  AB Living   4 3 3     3   SAB IAB Ectopic Molar Multiple Live Births             3      # Outcome Date GA Lbr Sabino/2nd Weight Sex Delivery Anes PTL Lv   4 Current            3 Term 14 40w0d  3.629 kg (8 lb) F Vag-Spont None N FAVIAN      Birth Comments: Pt states no complications   2 Term 09 40w0d  4.082 kg (9 lb) M Vag-Spont None N FAVIAN      Birth Comments: Pt states no complications   1 Term 04 40w0d  3.884 kg (8 lb 9 oz) F Vag-Spont None N FAVIAN      Birth Comments: Pt states no complications        Patient has no known allergies.  Patient Active Problem List    Diagnosis Date Noted   • Non-reassuring fetal heart rate or rhythm affecting management of mother 2022   • History of macrosomia in infant in prior pregnancy, currently pregnant 2022   • Advanced maternal age in multigravida 2022        Hospital Course:   36 y.o. , now para 4, was admitted with the above mentioned diagnosis, underwent Active Labor, vaginal, spontaneous. Patient postpartum course was unremarkable, with progressive advancement in diet , ambulation and toleration of oral analgesia. Patient without complaints today and desires discharge.      Vitals:    22 0338 22 0613 22 1000 22 1800   BP: 118/70 130/75 128/81 114/74   Pulse: 82 96 100 89   Resp: 17 17 17 17   Temp: 37 °C (98.6 °F) 36.9 °C (98.4 °F) 36.6 °C (97.9 °F) 36.7 °C (98 °F)   TempSrc: Temporal Temporal Temporal Temporal   SpO2: 97% 95% 96% 98%   Weight:       Height:            Current Facility-Administered Medications   Medication Dose   • lactated ringers infusion     • docusate sodium (COLACE) capsule 100 mg  100 mg   • ibuprofen (MOTRIN) tablet 800 mg  800 mg   • acetaminophen (TYLENOL) tablet 1,000 mg  1,000 mg   • PRN oxytocin (PITOCIN) (20 Units/1000 mL) PRN for excessive uterine bleeding - See Admin Instr  125-999 mL/hr   • miSOPROStol (CYTOTEC) tablet 600 mcg  600 mcg   • methylergonovine (METHERGINE) injection 0.2 mg  0.2 mg   • oxyCODONE immediate-release (ROXICODONE) tablet 5 mg  5 mg   • prenatal plus vitamin (STUARTNATAL 1+1) 27-1 MG tablet 1 Tablet  1 Tablet       Exam:  Breast Exam: negative  Abdomen: Abdomen soft, non-tender. BS normal. No masses,  No organomegaly  Fundus Non Tender: yes  Incision: none  Perineum: perineum intact  Extremity: extremities, peripheral pulses and reflexes normal     Labs:  Recent Labs     06/04/22 2000 06/05/22  0912   WBC 9.9 14.2*   RBC 4.68 4.35   HEMOGLOBIN 14.4 13.6   HEMATOCRIT 41.8 39.5   MCV 89.3 90.8   MCH 30.8 31.3   MCHC 34.4 34.4   RDW 42.5 42.2   PLATELETCT 221 193   MPV 10.4 11.0        Activity:   Discharge to home  Pelvic Rest x 6 weeks    Assessment:  normal postpartum course  Discharge Assessment: Taking in adequate diet and fluids, no heavy bleeding or foul smelling discharge, no redness or severe pain of breasts, voiding without difficulty     Follow [facility]: [facility] in 5 weeks for vaginal ; 1 week for incision check.      Pt need to call or go to ED for any of the following:  Fever over 100.5  Severe abd pain  Severe pain or swelling or drainage of laceration or incision site  Red streaks or painful masses in the breasts  Foul smelling d/c or lochia  Heavy vaginal bleeding saturating a pad per hour  Sxs of PP depression  Severe headache  Visual changes    Discharge Meds:   Current Outpatient Medications   Medication Sig Dispense Refill   • acetaminophen (TYLENOL) 500 MG Tab Take 2 Tablets by mouth  every 6 hours as needed for Moderate Pain. 30 Tablet 0   • docusate sodium 100 MG Cap Take 100 mg by mouth 2 times a day as needed for Constipation. 60 Capsule 0   • ibuprofen (MOTRIN) 800 MG Tab Take 1 Tablet by mouth every 8 hours as needed for Moderate Pain. 30 Tablet 0       JENNIFER Hernandez.

## 2022-07-13 ASSESSMENT — EDINBURGH POSTNATAL DEPRESSION SCALE (EPDS)
I HAVE BEEN ABLE TO LAUGH AND SEE THE FUNNY SIDE OF THINGS: AS MUCH AS I ALWAYS COULD
I HAVE BLAMED MYSELF UNNECESSARILY WHEN THINGS WENT WRONG: NOT VERY OFTEN
I HAVE FELT SAD OR MISERABLE: NO, NOT AT ALL
I HAVE BEEN SO UNHAPPY THAT I HAVE HAD DIFFICULTY SLEEPING: NOT AT ALL
THE THOUGHT OF HARMING MYSELF HAS OCCURRED TO ME: NEVER
TOTAL SCORE: 6
I HAVE LOOKED FORWARD WITH ENJOYMENT TO THINGS: DEFINITELY LESS THAN I USED TO
I HAVE BEEN ANXIOUS OR WORRIED FOR NO GOOD REASON: YES, SOMETIMES
I HAVE BEEN SO UNHAPPY THAT I HAVE BEEN CRYING: NO, NEVER
THINGS HAVE BEEN GETTING ON TOP OF ME: NO, MOST OF THE TIME I HAVE COPED QUITE WELL
I HAVE FELT SCARED OR PANICKY FOR NO GOOD REASON: NO, NOT AT ALL

## 2022-07-14 ENCOUNTER — POST PARTUM (OUTPATIENT)
Dept: OBGYN | Facility: CLINIC | Age: 37
End: 2022-07-14

## 2022-07-14 VITALS — DIASTOLIC BLOOD PRESSURE: 86 MMHG | BODY MASS INDEX: 36.03 KG/M2 | WEIGHT: 197 LBS | SYSTOLIC BLOOD PRESSURE: 120 MMHG

## 2022-07-14 PROCEDURE — 0503F POSTPARTUM CARE VISIT: CPT | Performed by: ADVANCED PRACTICE MIDWIFE

## 2022-07-14 NOTE — PROGRESS NOTES
Subjective   Subjective:    Tami Hernandez is a 37 y.o. female who presents for her postpartum exam 6 weeks following . Her prenatal course was uncomplicated.  Her delivery was uncomplicated outside of initial NRFHTs. Her method of feeding infant is breast. She desires a LARC for her birth control method. She previously used Depo Provera and it did work well for her. Reports no sex prior to this appointment. Patient denies crying spells, irritability, or mood swings.       Eating a regular diet without difficulty.   Bowel movement are Normal.      Breast problems no  Dysuria no  Vaginal bleeding no  Vaginal itching no      Problem List     Patient Active Problem List    Diagnosis Date Noted   • Non-reassuring fetal heart rate or rhythm affecting management of mother 2022   • History of macrosomia in infant in prior pregnancy, currently pregnant 2022   • Advanced maternal age in multigravida 2022       Objective    EDPS 0      Lab:  Recent Results (from the past 1008 hour(s))   CBC WITH DIFFERENTIAL    Collection Time: 22  8:00 PM   Result Value Ref Range    WBC 9.9 4.8 - 10.8 K/uL    RBC 4.68 4.20 - 5.40 M/uL    Hemoglobin 14.4 12.0 - 16.0 g/dL    Hematocrit 41.8 37.0 - 47.0 %    MCV 89.3 81.4 - 97.8 fL    MCH 30.8 27.0 - 33.0 pg    MCHC 34.4 33.6 - 35.0 g/dL    RDW 42.5 35.9 - 50.0 fL    Platelet Count 221 164 - 446 K/uL    MPV 10.4 9.0 - 12.9 fL    Neutrophils-Polys 66.00 44.00 - 72.00 %    Lymphocytes 22.20 22.00 - 41.00 %    Monocytes 9.30 0.00 - 13.40 %    Eosinophils 0.50 0.00 - 6.90 %    Basophils 0.40 0.00 - 1.80 %    Immature Granulocytes 1.60 (H) 0.00 - 0.90 %    Nucleated RBC 0.00 /100 WBC    Neutrophils (Absolute) 6.51 2.00 - 7.15 K/uL    Lymphs (Absolute) 2.19 1.00 - 4.80 K/uL    Monos (Absolute) 0.92 (H) 0.00 - 0.85 K/uL    Eos (Absolute) 0.05 0.00 - 0.51 K/uL    Baso (Absolute) 0.04 0.00 - 0.12 K/uL    Immature Granulocytes (abs) 0.16 (H) 0.00 - 0.11 K/uL    NRBC  (Absolute) 0.00 K/uL   HOLD BLOOD BANK SPECIMEN (NOT TESTED)    Collection Time: 06/04/22  8:00 PM   Result Value Ref Range    Holding Tube - Bb DONE    CBC without differential- Once in AM regardless of delivery time    Collection Time: 06/05/22  9:12 AM   Result Value Ref Range    WBC 14.2 (H) 4.8 - 10.8 K/uL    RBC 4.35 4.20 - 5.40 M/uL    Hemoglobin 13.6 12.0 - 16.0 g/dL    Hematocrit 39.5 37.0 - 47.0 %    MCV 90.8 81.4 - 97.8 fL    MCH 31.3 27.0 - 33.0 pg    MCHC 34.4 33.6 - 35.0 g/dL    RDW 42.2 35.9 - 50.0 fL    Platelet Count 193 164 - 446 K/uL    MPV 11.0 9.0 - 12.9 fL     Vitals:    07/14/22 0959   BP: 120/86   Weight: 89.4 kg (197 lb)     Vitals:    07/14/22 0959   BP: 120/86     Objective    Well nourished female in no acute distress  A& O x 3  Heart RRR  LCTAB  No edema noted and pulses WNL bilaterally in lower extremities; no claudication  BS x 4; no guarding or tenderness  Breasts: no erythema or discharge. No masses or tenderness.       Assessment   Assessment:    1. Postpartum Exam  2. General Counseling and Advice on Contraception  3. Screening for Postpartum Depression      Plan   Plan:    1. Breastfeeding support   2. Continue PNV   3. Contraceptive counseling- Discussed birth control options. Will send request for marci IUD or Nexplanon.   4. Discussed diet, exercise and resumption of sexual activity.  Discussed signs and symptoms of stress incontinence and reviewed pelvic floor exercises.    5. Follow up for placement of LARC

## 2022-07-14 NOTE — PROGRESS NOTES
Pt here today for postpartum exam.  Delivery Date 2022   Operation Date: 2022  Currently: Just breast feeding  BCM: discuss BCM with Trung,  LMP: no period yet, still bleeding but light  Good ph:[phone] (home)    Pt had a , no tearing  Pt states no complaints really only wants BCM  Pt wants to know what vitamins she can take

## 2023-08-28 NOTE — PROGRESS NOTES
S:  Reports some occ UCs.  Has US today for growth.  Reports good FM.  Denies VB, LOF, RUCs or vaginal DC.      O:    Vitals:    05/10/22 1010   BP: 110/62   Weight: 96.6 kg (213 lb)     See flow sheet.    A:  IUP at 34w0d  Patient Active Problem List    Diagnosis Date Noted   • History of macrosomia in infant in prior pregnancy, currently pregnant 02/24/2022   • Advanced maternal age in multigravida 01/26/2022        P:  1.  GBS @ 36 wks.          2.  Continue FKCs.          3.  Questions answered.          4.  L&D policies reviewed w pt.        5.  Encourage adequate water intake.        6.  F/u 2 wks.        7.  Weekly NST @ 36wks.        8.  Keep growth US as scheduled today.       Hpi Title: Evaluation of Skin Lesions How Severe Are Your Spot(S)?: mild